# Patient Record
Sex: MALE | Race: WHITE | Employment: OTHER | URBAN - METROPOLITAN AREA
[De-identification: names, ages, dates, MRNs, and addresses within clinical notes are randomized per-mention and may not be internally consistent; named-entity substitution may affect disease eponyms.]

---

## 2017-03-14 ENCOUNTER — OFFICE VISIT (OUTPATIENT)
Dept: FAMILY MEDICINE CLINIC | Age: 42
End: 2017-03-14

## 2017-03-14 ENCOUNTER — DOCUMENTATION ONLY (OUTPATIENT)
Dept: FAMILY MEDICINE CLINIC | Age: 42
End: 2017-03-14

## 2017-03-14 VITALS
RESPIRATION RATE: 16 BRPM | WEIGHT: 183.2 LBS | OXYGEN SATURATION: 96 % | SYSTOLIC BLOOD PRESSURE: 122 MMHG | TEMPERATURE: 98.1 F | BODY MASS INDEX: 24.28 KG/M2 | HEART RATE: 98 BPM | DIASTOLIC BLOOD PRESSURE: 84 MMHG | HEIGHT: 73 IN

## 2017-03-14 DIAGNOSIS — M25.562 CHRONIC PAIN OF LEFT KNEE: ICD-10-CM

## 2017-03-14 DIAGNOSIS — S06.9X5S: ICD-10-CM

## 2017-03-14 DIAGNOSIS — Z00.00 ROUTINE GENERAL MEDICAL EXAMINATION AT A HEALTH CARE FACILITY: ICD-10-CM

## 2017-03-14 DIAGNOSIS — M79.2 NEUROPATHIC PAIN: Primary | ICD-10-CM

## 2017-03-14 DIAGNOSIS — G89.29 CHRONIC PAIN OF LEFT KNEE: ICD-10-CM

## 2017-03-14 PROBLEM — S06.9X9A TRAUMATIC BRAIN INJURY WITH LOSS OF CONSCIOUSNESS (HCC): Status: ACTIVE | Noted: 2017-03-14

## 2017-03-14 RX ORDER — TRIHEXYPHENIDYL HYDROCHLORIDE 2 MG/1
TABLET ORAL 2 TIMES DAILY
COMMUNITY

## 2017-03-14 RX ORDER — BACLOFEN 10 MG/1
10 TABLET ORAL 2 TIMES DAILY
COMMUNITY

## 2017-03-14 RX ORDER — TOPIRAMATE 25 MG/1
12.5 TABLET ORAL ONCE
COMMUNITY
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
## 2017-03-14 NOTE — PROGRESS NOTES
Pt scheduled for nerve conduction study 3/16/17, [address] [facility] [address] Patient. Left msg on pt's VM with details.

## 2017-03-14 NOTE — MR AVS SNAPSHOT
Visit Information Date & Time Provider Department Dept. Phone Encounter #  
 3/14/2017  1:00 PM Randa Chandler, [facility] [phone] [id number] Allergies as of 3/14/2017  Review Complete On: 3/14/2017 By: Randa Chandler MD  
 No Known Allergies Current Immunizations  Never Reviewed No immunizations on file. Not reviewed this visit You Were Diagnosed With   
  
 Codes Comments Neuropathic pain    -  Primary ICD-10-CM: M79.2 ICD-9-CM: 729.2 Traumatic brain injury with loss of consciousness, with loss of consciousness greater than 24 hours with return to pre-existing conscious level, sequela ([facility] [address]     ICD-10-CM: R88.4R6U 
ICD-9-CM: 979. 0 Chronic pain of left knee     ICD-10-CM: M25.562, G89.29 ICD-9-CM: 719.46, 338.29 Routine general medical examination at a health care facility     ICD-10-CM: Z00.00 ICD-9-CM: V70.0 Vitals BP Pulse Temp Resp Height(growth percentile) Weight(growth percentile) 122/84 98 98.1 °F (36.7 °C) 16 6' 0.5\" (1.842 m) 183 lb 3.2 oz (83.1 kg) SpO2 BMI Smoking Status 96% 24.5 kg/m2 Never Smoker Vitals History BMI and BSA Data Body Mass Index Body Surface Area 24.5 kg/m 2 2.06 m 2 Your Updated Medication List  
  
   
This list is accurate as of: 3/14/17  1:55 PM.  Always use your most recent med list.  
  
  
  
  
 baclofen 10 mg tablet Commonly known as:  LIORESAL Take  by mouth four (4) times daily. INNOPRAN  mg Cp24 Generic drug:  propranolol Take  by mouth. TOPAMAX 25 mg tablet Generic drug:  topiramate Take 12.5 mg by mouth once. trihexyphenidyl 2 mg tablet Commonly known as:  ARTANE Take  by mouth two (2) times a day. To-Do List   
 03/14/2017 Lab:  CBC W/O DIFF   
  
 03/14/2017 Lab:  LIPID PANEL   
  
 03/14/2017 Lab:  METABOLIC PANEL, COMPREHENSIVE Patient Instructions Electromyogram (EMG) and Nerve Conduction Studies: About These Tests What are they? An electromyogram (EMG) measures the electrical activity of your muscles when you are not using them (at rest) and when you tighten them (muscle contraction). Nerve conduction studies (NCS) measure how well and how fast the nerves can send electrical signals. EMG and nerve conduction studies are often done together. If they are done together, the nerve conduction studies are done before the EMG. Why are they done? You may need an EMG to find diseases that damage your muscles or nerves or to find why you cannot move your muscles (paralysis), why they feel weak, or why they twitch. You may need nerve conduction studies to find damage to the nerves that lead from the brain and spinal cord to the rest of the body (peripheral nervous system). Nerve conduction studies are often used to help find nerve disorders, such as carpal tunnel syndrome. How can you prepare for these tests? · Tell your doctors ALL the medicines, vitamins, supplements, and herbal remedies you take. Some medicines can affect the test results. You may need to stop taking some medicines before you have this test. 
· If you take aspirin or some other blood thinner, be sure to talk to your doctor. He or she will tell you if you should stop taking it before your test. Make sure that you understand exactly what your doctor wants you to do. · Wear loose-fitting clothing. You may be given a hospital gown to wear. · The electrodes for the test are attached to your skin. Your skin needs to be clean and free of sprays, oils, creams, and lotions. What happens during the tests? You lie on a table or bed or sit in a reclining chair so your muscles are relaxed. For an EMG: 
· Your doctor will insert a needle electrode into a muscle.  This will record the electrical activity while the muscle is at rest. You may feel a quick, sharp pain when the needle electrode is put into a muscle. · Your doctor will ask you to tighten the same muscle slowly and steadily while the electrical activity is recorded. · Your doctor may move the electrode to a different area of the muscle or a different muscle. For nerve conduction studies: 
· Your doctor will attach two types of electrodes to your skin. ¨ One type of electrode is placed over a nerve and will give the nerve an electrical pulse. ¨ The other type of electrode is placed over the muscle that the nerve controls. It will record how long it takes the muscle to react to the electrical pulse. · You will be able to feel the electrical pulses. They are small shocks and are safe. What else should you know about these tests? · After an EMG, you may be sore and have a tingling feeling in your muscles for up to 2 days. You may have small bruises or swelling at the needle site. · For an EMG, you may be asked to sign a consent form. Talk to your doctor about any concerns you have about the need for the test, its risks, how it will be done, or what the results will mean. How long do they take? · An EMG may take 30 to 60 minutes. · Nerve conduction tests may take from 15 minutes to 1 hour or more. It depends on how many nerves and muscles your doctor tests. What happens after these tests? · If any of the test areas are sore: ¨ Put ice or a cold pack on the area for 10 to 20 minutes at a time. Put a thin cloth between the ice and your skin. ¨ Take an over-the-counter pain medicine, such as acetaminophen (Tylenol), ibuprofen (Advil, Motrin), or naproxen (Aleve). Be safe with medicines. Read and follow all instructions on the label. · You will probably be able to go home right away. · You can go back to your usual activities right away. When should you call for help? Watch closely for changes in your health, and be sure to contact your doctor if: · Muscle pain from an EMG test gets worse or you have swelling, tenderness, or pus at any of the needle sites. · You have any problems that you think may be from the test. 
· You have any questions about the test or have not received your results. Follow-up care is a key part of your treatment and safety. Be sure to make and go to all appointments, and call your doctor if you are having problems. It's also a good idea to keep a list of the medicines you take. Ask your doctor when you can expect to have your test results. Where can you learn more? Go to http://joe-kenzie.info/. Enter S440 in the search box to learn more about \"Electromyogram (EMG) and Nerve Conduction Studies: About These Tests. \" Current as of: June 1, 2016 Content Version: 11.1 © 7100-2618 [employer]. Care instructions adapted under license by bright box (which disclaims liability or warranty for this information). If you have questions about a medical condition or this instruction, always ask your healthcare professional. [place] [address] any warranty or liability for your use of this information. Introducing [facility]! [facility] introduces SimpleOrder patient portal. Now you can access parts of your medical record, email your doctor's office, and request medication refills online. 1. In your internet browser, go to [email or web address] for iPhone and [email or web address] 2. Click on the First Time User? Click Here link in the Sign In box. You will see the New Member Sign Up page. 3. Enter your SimpleOrder Access Code exactly as it appears below. You will not need to use this code after youve completed the sign-up process. If you do not sign up before the expiration date, you must request a new code. · SimpleOrder Access Code: [email or web address] Expires: 6/12/2017  1:55 PM 
 
4.  Enter the last four digits of your Social Security Number (xxxx) and Date of Birth (mm/dd/yyyy) as indicated and click Submit. You will be taken to the next sign-up page. 5. Create a zlien ID. This will be your zlien login ID and cannot be changed, so think of one that is secure and easy to remember. 6. Create a zlien password. You can change your password at any time. 7. Enter your Password Reset Question and Answer. This can be used at a later time if you forget your password. 8. Enter your e-mail address. You will receive e-mail notification when new information is available in [address] 9. Click Sign Up. You can now view and download portions of your medical record. 10. Click the Download Summary menu link to download a portable copy of your medical information. If you have questions, please visit the Frequently Asked Questions section of the zlien website. Remember, zlien is NOT to be used for urgent needs. For medical emergencies, dial 911. Now available from your iPhone and Android! Please provide this summary of care documentation to your next provider. Your primary care clinician is listed as [address]. If you have any questions after today's visit, please call [phone].

## 2017-03-14 NOTE — PATIENT INSTRUCTIONS
Electromyogram (EMG) and Nerve Conduction Studies: About These Tests  What are they? An electromyogram (EMG) measures the electrical activity of your muscles when you are not using them (at rest) and when you tighten them (muscle contraction). Nerve conduction studies (NCS) measure how well and how fast the nerves can send electrical signals. EMG and nerve conduction studies are often done together. If they are done together, the nerve conduction studies are done before the EMG. Why are they done? You may need an EMG to find diseases that damage your muscles or nerves or to find why you cannot move your muscles (paralysis), why they feel weak, or why they twitch. You may need nerve conduction studies to find damage to the nerves that lead from the brain and spinal cord to the rest of the body (peripheral nervous system). Nerve conduction studies are often used to help find nerve disorders, such as carpal tunnel syndrome. How can you prepare for these tests? · Tell your doctors ALL the medicines, vitamins, supplements, and herbal remedies you take. Some medicines can affect the test results. You may need to stop taking some medicines before you have this test.  · If you take aspirin or some other blood thinner, be sure to talk to your doctor. He or she will tell you if you should stop taking it before your test. Make sure that you understand exactly what your doctor wants you to do. · Wear loose-fitting clothing. You may be given a hospital gown to wear. · The electrodes for the test are attached to your skin. Your skin needs to be clean and free of sprays, oils, creams, and lotions. What happens during the tests? You lie on a table or bed or sit in a reclining chair so your muscles are relaxed. For an EMG:  · Your doctor will insert a needle electrode into a muscle.  This will record the electrical activity while the muscle is at rest. You may feel a quick, sharp pain when the needle electrode is put into a muscle. · Your doctor will ask you to tighten the same muscle slowly and steadily while the electrical activity is recorded. · Your doctor may move the electrode to a different area of the muscle or a different muscle. For nerve conduction studies:  · Your doctor will attach two types of electrodes to your skin. ¨ One type of electrode is placed over a nerve and will give the nerve an electrical pulse. ¨ The other type of electrode is placed over the muscle that the nerve controls. It will record how long it takes the muscle to react to the electrical pulse. · You will be able to feel the electrical pulses. They are small shocks and are safe. What else should you know about these tests? · After an EMG, you may be sore and have a tingling feeling in your muscles for up to 2 days. You may have small bruises or swelling at the needle site. · For an EMG, you may be asked to sign a consent form. Talk to your doctor about any concerns you have about the need for the test, its risks, how it will be done, or what the results will mean. How long do they take? · An EMG may take 30 to 60 minutes. · Nerve conduction tests may take from 15 minutes to 1 hour or more. It depends on how many nerves and muscles your doctor tests. What happens after these tests? · If any of the test areas are sore:  ¨ Put ice or a cold pack on the area for 10 to 20 minutes at a time. Put a thin cloth between the ice and your skin. ¨ Take an over-the-counter pain medicine, such as acetaminophen (Tylenol), ibuprofen (Advil, Motrin), or naproxen (Aleve). Be safe with medicines. Read and follow all instructions on the label. · You will probably be able to go home right away. · You can go back to your usual activities right away. When should you call for help?   Watch closely for changes in your health, and be sure to contact your doctor if:  · Muscle pain from an EMG test gets worse or you have swelling, tenderness, or pus at any of the needle sites. · You have any problems that you think may be from the test.  · You have any questions about the test or have not received your results. Follow-up care is a key part of your treatment and safety. Be sure to make and go to all appointments, and call your doctor if you are having problems. It's also a good idea to keep a list of the medicines you take. Ask your doctor when you can expect to have your test results. Where can you learn more? Go to http://joe-kenzie.info/. Enter P377 in the search box to learn more about \"Electromyogram (EMG) and Nerve Conduction Studies: About These Tests. \"  Current as of: June 1, 2016  Content Version: 11.1  © 4707-9279 [employer]. Care instructions adapted under license by [employer] (which disclaims liability or warranty for this information). If you have questions about a medical condition or this instruction, always ask your healthcare professional. [address] any warranty or liability for your use of this information.

## 2017-03-15 ENCOUNTER — TELEPHONE (OUTPATIENT)
Dept: FAMILY MEDICINE CLINIC | Age: 42
End: 2017-03-15

## 2017-03-15 NOTE — TELEPHONE ENCOUNTER
Pt called to inform that he has an appt with the [facility] surgeon at 8:30am tomorrow at the [facility] location

## 2017-04-03 ENCOUNTER — HOSPITAL ENCOUNTER (OUTPATIENT)
Dept: LAB | Age: 42
Discharge: HOME | End: 2017-04-03
Payer: MEDICARE

## 2017-04-03 ENCOUNTER — TELEPHONE (OUTPATIENT)
Dept: FAMILY MEDICINE CLINIC | Age: 42
End: 2017-04-03

## 2017-04-03 DIAGNOSIS — Z00.00 ROUTINE GENERAL MEDICAL EXAMINATION AT A HEALTH CARE FACILITY: ICD-10-CM

## 2017-04-03 DIAGNOSIS — M79.2 NEUROPATHIC PAIN: ICD-10-CM

## 2017-04-03 LAB
ALBUMIN SERPL BCP-MCNC: 3.9 G/DL (ref 3.4–5)
ALBUMIN/GLOB SERPL: 1.4 {RATIO} (ref 0.8–1.7)
ALP SERPL-CCNC: 65 U/L (ref 45–117)
ALT SERPL-CCNC: 26 U/L (ref 16–61)
ANION GAP BLD CALC-SCNC: 10 MMOL/L (ref 3–18)
AST SERPL W P-5'-P-CCNC: 16 U/L (ref 15–37)
BILIRUB SERPL-MCNC: 0.5 MG/DL (ref 0.2–1)
BUN SERPL-MCNC: 19 MG/DL (ref 7–18)
BUN/CREAT SERPL: 23 (ref 12–20)
CALCIUM SERPL-MCNC: 8.3 MG/DL (ref 8.5–10.1)
CHLORIDE SERPL-SCNC: 107 MMOL/L (ref 100–108)
CHOLEST SERPL-MCNC: 179 MG/DL
CO2 SERPL-SCNC: 25 MMOL/L (ref 21–32)
CREAT SERPL-MCNC: 0.84 MG/DL (ref 0.6–1.3)
ERYTHROCYTE [DISTWIDTH] IN BLOOD BY AUTOMATED COUNT: 13.3 % (ref 11.6–14.5)
GLOBULIN SER CALC-MCNC: 2.8 G/DL (ref 2–4)
GLUCOSE SERPL-MCNC: 92 MG/DL (ref 74–99)
HCT VFR BLD AUTO: 48.6 % (ref 36–48)
HDLC SERPL-MCNC: 49 MG/DL (ref 40–60)
HDLC SERPL: 3.7 {RATIO} (ref 0–5)
HGB BLD-MCNC: 15.5 G/DL (ref 13–16)
LDLC SERPL CALC-MCNC: 113 MG/DL (ref 0–100)
LIPID PROFILE,FLP: ABNORMAL
MCH RBC QN AUTO: 29.3 PG (ref 24–34)
MCHC RBC AUTO-ENTMCNC: 31.9 G/DL (ref 31–37)
MCV RBC AUTO: 91.9 FL (ref 74–97)
PLATELET # BLD AUTO: 184 K/UL (ref 135–420)
PMV BLD AUTO: 12.4 FL (ref 9.2–11.8)
POTASSIUM SERPL-SCNC: 4.3 MMOL/L (ref 3.5–5.5)
PROT SERPL-MCNC: 6.7 G/DL (ref 6.4–8.2)
RBC # BLD AUTO: 5.29 M/UL (ref 4.7–5.5)
SODIUM SERPL-SCNC: 142 MMOL/L (ref 136–145)
TRIGL SERPL-MCNC: 85 MG/DL (ref ?–150)
TSH SERPL DL<=0.05 MIU/L-ACNC: 1.79 UIU/ML (ref 0.36–3.74)
VIT B12 SERPL-MCNC: 600 PG/ML (ref 211–911)
VLDLC SERPL CALC-MCNC: 17 MG/DL
WBC # BLD AUTO: 6.1 K/UL (ref 4.6–13.2)

## 2017-04-03 PROCEDURE — 84443 ASSAY THYROID STIM HORMONE: CPT | Performed by: INTERNAL MEDICINE

## 2017-04-03 PROCEDURE — 36415 COLL VENOUS BLD VENIPUNCTURE: CPT | Performed by: INTERNAL MEDICINE

## 2017-04-03 PROCEDURE — 80061 LIPID PANEL: CPT | Performed by: INTERNAL MEDICINE

## 2017-04-03 PROCEDURE — 85027 COMPLETE CBC AUTOMATED: CPT | Performed by: INTERNAL MEDICINE

## 2017-04-03 PROCEDURE — 82607 VITAMIN B-12: CPT | Performed by: INTERNAL MEDICINE

## 2017-04-03 PROCEDURE — 80053 COMPREHEN METABOLIC PANEL: CPT | Performed by: INTERNAL MEDICINE

## 2017-04-03 NOTE — TELEPHONE ENCOUNTER
Pt callled and states that his Doctor up [facility] wants the pts to get an mri done. Pt states that he gave us a copy of his mri order and that we would be faxing over the order to [facility] for him. He has yet to hear from [facility] I offered to give him the number for [facility] to call them to [facility].  PLease advisde

## 2017-04-04 NOTE — TELEPHONE ENCOUNTER
Call placed to patient, notified that order for MRI from previous MD had been sent over to [facility]. Number for scheduling given to patient. Advised to return call with any questions.

## 2017-04-18 ENCOUNTER — OFFICE VISIT (OUTPATIENT)
Dept: FAMILY MEDICINE CLINIC | Age: 42
End: 2017-04-18

## 2017-04-18 VITALS
OXYGEN SATURATION: 98 % | WEIGHT: 183 LBS | DIASTOLIC BLOOD PRESSURE: 82 MMHG | TEMPERATURE: 98 F | HEIGHT: 72 IN | RESPIRATION RATE: 18 BRPM | SYSTOLIC BLOOD PRESSURE: 128 MMHG | BODY MASS INDEX: 24.79 KG/M2 | HEART RATE: 64 BPM

## 2017-04-18 DIAGNOSIS — M51.24 THORACIC DISC HERNIATION: ICD-10-CM

## 2017-04-18 DIAGNOSIS — M48.00 CENTRAL STENOSIS OF SPINAL CANAL: ICD-10-CM

## 2017-04-18 DIAGNOSIS — M48.061 FORAMINAL STENOSIS OF LUMBAR REGION: Primary | ICD-10-CM

## 2017-04-18 PROBLEM — S46.212A RUPTURE OF LEFT BICEPS TENDON: Status: ACTIVE | Noted: 2017-04-18

## 2017-04-18 NOTE — PROGRESS NOTES
Assessment/Plan:    1. Foraminal stenosis of lumbar region  -pt declines intervention at this time. Declines neurosurgery referral and prescription meds. 2. Central stenosis of spinal canal    3. Thoracic disc herniation    The plan was discussed with the patient. The patient verbalized understanding and is in agreement with the plan. All medication potential side effects were discussed with the patient. Health Maintenance:   Health Maintenance   Topic Date Due    DTaP/Tdap/Td series (1 - Tdap) 06/14/1996    INFLUENZA AGE 9 TO ADULT  Addressed     Negar Clemons is a 39 y.o. male and presents with Follow-up     Subjective:  Pt had MRI done, ordered by physician in [place] (done here). It's not entirely clear why the physician ordered a thoracic spine MRI, because we hadn't really discussed any symptoms. Upon further questioning, he states he intermittently has some numbness in the R hand only. No proximal arm sx. We had discussed his anterior thigh paresthesias, and the MRI findings may or may not correlate to his sx. I had ordered a NCS prior to the MRI getting done, which was never performed. He doesn't wish to pursue any intervention with a neurosurgeon or any prescription meds. He states OTC advil is sufficient to manage his sx. ROS:  Constitutional: No recent weight change. No weakness/fatigue. No f/c. Cardiovascular: No CP/palpitations. No JOHNSON/orthopnea/PND. Respiratory: No cough/sputum, dyspnea, wheezing. Gastointestinal: No dysphagia, reflux. No n/v. No constipation/diarrhea. No melena/rectal bleeding. Neurological: No seizures/numbness/weakness. + paresthesias. The problem list was updated as a part of today's visit. Patient Active Problem List   Diagnosis Code    Traumatic brain injury with loss of consciousness ([facility] [address] S06. 9X9A    Foraminal stenosis of lumbar region M99.83    Central stenosis of spinal canal M48.00       The PSH, FH were reviewed.     SH:  Social History   Substance Use Topics    Smoking status: Never Smoker    Smokeless tobacco: Never Used    Alcohol use Yes      Comment: socially       Medications/Allergies:  Current Outpatient Prescriptions on File Prior to Visit   Medication Sig Dispense Refill    trihexyphenidyl (ARTANE) 2 mg tablet Take  by mouth two (2) times a day.  propranolol (INNOPRAN XL) 120 mg cp24 Take  by mouth.  baclofen (LIORESAL) 10 mg tablet Take  by mouth four (4) times daily.  topiramate (TOPAMAX) 25 mg tablet Take 12.5 mg by mouth once. No current facility-administered medications on file prior to visit. No Known Allergies    Objective:  Visit Vitals    /82    Pulse 64    Temp 98 °F (36.7 °C) (Oral)    Resp 18    Ht 6' (1.829 m)    Wt 183 lb (83 kg)    SpO2 98%    BMI 24.82 kg/m2      Constitutional: Well developed, nourished, no distress, alert   CV: S1, S2.  RRR. No murmurs/rubs. No thrills palpated. No carotid bruits. Intact distal pulses. No edema. Pulm: No abnormalities on inspection. Clear to auscultation bilaterally. No wheezing/rhonchi. Normal effort. Neuro: A/O x 3.  Speech dysarthric   MS: scott defomity of L biceps head  Labwork and Ancillary Studies:    CBC w/Diff  Lab Results   Component Value Date/Time    WBC 6.1 04/03/2017 07:24 AM    HGB 15.5 04/03/2017 07:24 AM    PLATELET 065 38/29/8715 07:24 AM         Basic Metabolic Profile/LFTs  Lab Results   Component Value Date/Time    Sodium 142 04/03/2017 07:24 AM    Potassium 4.3 04/03/2017 07:24 AM    Chloride 107 04/03/2017 07:24 AM    CO2 25 04/03/2017 07:24 AM    Anion gap 10 04/03/2017 07:24 AM    Glucose 92 04/03/2017 07:24 AM    BUN 19 04/03/2017 07:24 AM    Creatinine 0.84 04/03/2017 07:24 AM    BUN/Creatinine ratio 23 04/03/2017 07:24 AM    GFR est AA >60 04/03/2017 07:24 AM    GFR est non-AA >60 04/03/2017 07:24 AM    Calcium 8.3 04/03/2017 07:24 AM      Lab Results   Component Value Date/Time    ALT (SGPT) 26 04/03/2017 07:24 AM    AST (SGOT) 16 04/03/2017 07:24 AM    Alk. phosphatase 65 04/03/2017 07:24 AM    Bilirubin, total 0.5 04/03/2017 07:24 AM       Cholesterol  Lab Results   Component Value Date/Time    Cholesterol, total 179 04/03/2017 07:24 AM    HDL Cholesterol 49 04/03/2017 07:24 AM    LDL, calculated 113 04/03/2017 07:24 AM    Triglyceride 85 04/03/2017 07:24 AM    CHOL/HDL Ratio 3.7 04/03/2017 07:24 AM     MRI lumbar spine: Mild to moderate degenerative levoconvex lumbar scoliosis with panlumbar and asymmetric degenerative disc disease greatest at moderate at L1-2 L2-3 and L3-4  with associated intervertebral osteochondrosis including edema-like reaction and chronic right lateral vertebral body wedging. 2.  Mild-to-moderate L2-3 and L3-4, mild L1-2 central stenosis from mild disc bulging and spondylosis with broad outer annular fissures and lesser contribution from facet joint osteoarthritis/ligamentum flavum thickening as above  3.  Multilevel and bilateral foraminal stenosis, also detailed above, greatest and moderate on the left at L5-S1 from eccentric spondylosis and facet joint osteoarthritis with left L5 exiting root impingement.  Multilevel and bilateral mostly mild facet joint osteoarthritis, for the most part asymmetric in association with scoliosis. MRI thoracic spine:  Moderate sized extruded left paracentral T7-8 and T8-9 disc herniations, with mild to moderate flattening of the left ventral cord without abnormal cord signal.

## 2017-04-18 NOTE — PATIENT INSTRUCTIONS
Herniated Disc: Exercises  Your Care Instructions  Here are some examples of typical rehabilitation exercises for your condition. Start each exercise slowly. Ease off the exercise if you start to have pain. Your doctor or physical therapist will tell you when you can start these exercises and which ones will work best for you. How to do the exercises  Note: These exercises can help you move easier and feel better. But when you first start doing them, you may have more pain in your back. This is normal. But it is important to pay close attention to your pain during and after each exercise. · Keep doing these exercises if your pain stays the same or moves from your leg and buttock more toward the middle of your spine. Pain moving out of your leg and buttock is a good sign. · Stop doing these exercises if your pain gets worse in your leg and buttock. Stop if you start to have pain in your leg and buttock that you didn't have before. Be sure to do these exercises in the order they appear. Note how your pain changes before you move to the next one. If your pain is much worse right after exercise and stays worse the next day, do not do any of these exercises. 1. Rest on belly    1. Lie on your stomach, face down, with your head turned to the side. Place your arms beside your body. If this bothers your neck, place your hands, one on top of the other, underneath your forehead. This will help support your head and neck. 2. Try to relax your lower back muscles as much as you can. 3. Continue to lie on your stomach for 2 minutes. 4. If your pain spreads down your leg or increases down your leg, stop this exercise and do not do the next exercises. 2. Press-up    1. Lie on your stomach, face down. Keep your elbows tucked into your sides and under your shoulders. 2. Press your elbows down into the floor to raise your upper back. As you do this, relax your stomach muscles.  Allow your back to arch without using your back muscles. Let your low back relax completely as you arch up. 3. Hold this position for 2 minutes. 4. Repeat 2 to 4 times. 5. If your pain spreads down your leg or increases down your leg, stop this exercise and do not do the next exercises. 3. Full press-up    1. Lie on your stomach, face down. Keep your elbows tucked into your sides and under your shoulders. 2. Straighten your elbows, and push your upper body up as far as you can. Allow your lower back to sag. Keep your hips, pelvis, and legs relaxed. 3. Hold this position for 5 seconds, and then relax. 4. Repeat 10 times. Each time, try to raise your upper body a little higher and hold your arms a bit straighter. 5. If your pain spreads down your leg or gets worse down your leg, stop this exercise and do not move to the next exercise. 6. If you can't do this exercise, you may instead try the backward bend exercise that follows. 4. Backward bend    · Stand with your feet hip-width apart. Your toes should point forward. Do not lock your knees. · Place your hands in the small of your back. · Bend backward as far as you can, keeping your knees straight. Hold this position for 2 to 3 seconds. Then return to your starting position. · Repeat 2 to 4 times. Each time, try to bend backward a little farther, until you bend backward as far as you can. · If your pain spreads down your leg or increases down your leg, stop this exercise. Follow-up care is a key part of your treatment and safety. Be sure to make and go to all appointments, and call your doctor if you are having problems. It's also a good idea to know your test results and keep a list of the medicines you take. Where can you learn more? Go to http://joe-kenzie.info/. Enter [phone] [id number] in the search box to learn more about \"Herniated Disc: Exercises. \"  Current as of: May 23, 2016  Content Version: 11.2  © 0823-6204 [employer].  Care instructions adapted under license by [address] (which disclaims liability or warranty for this information). If you have questions about a medical condition or this instruction, always ask your healthcare professional. [address] any warranty or liability for your use of this information.

## 2017-04-18 NOTE — MR AVS SNAPSHOT
Visit Information Date & Time Provider Department Dept. Phone Encounter #  
 4/18/2017  7:30 AM Poly Britton, [facility] [phone] [id number] Upcoming Health Maintenance Date Due DTaP/Tdap/Td series (1 - Tdap) 6/14/1996 INFLUENZA AGE 9 TO ADULT 8/1/2016 Allergies as of 4/18/2017  Review Complete On: 4/18/2017 By: Yazan Perez LPN No Known Allergies Current Immunizations  Never Reviewed No immunizations on file. Not reviewed this visit You Were Diagnosed With   
  
 Codes Comments Foraminal stenosis of lumbar region    -  Primary ICD-10-CM: M99.83 ICD-9-CM: 724.02 Central stenosis of spinal canal     ICD-10-CM: M48.00 ICD-9-CM: 724.00 Thoracic disc herniation     ICD-10-CM: M51.24 
ICD-9-CM: 722.11 Vitals BP Pulse Temp Resp Height(growth percentile) Weight(growth percentile) 128/82 64 98 °F (36.7 °C) (Oral) 18 6' (1.829 m) 183 lb (83 kg) SpO2 BMI Smoking Status 98% 24.82 kg/m2 Never Smoker Vitals History BMI and BSA Data Body Mass Index Body Surface Area  
 24.82 kg/m 2 2.05 m 2 Your Updated Medication List  
  
   
This list is accurate as of: 4/18/17  7:45 AM.  Always use your most recent med list.  
  
  
  
  
 baclofen 10 mg tablet Commonly known as:  LIORESAL Take  by mouth four (4) times daily. INNOPRAN  mg Cp24 Generic drug:  propranolol Take  by mouth. TOPAMAX 25 mg tablet Generic drug:  topiramate Take 12.5 mg by mouth once. trihexyphenidyl 2 mg tablet Commonly known as:  ARTANE Take  by mouth two (2) times a day. Patient Instructions Herniated Disc: Exercises Your Care Instructions Here are some examples of typical rehabilitation exercises for your condition. Start each exercise slowly. Ease off the exercise if you start to have pain. Your doctor or physical therapist will tell you when you can start these exercises and which ones will work best for you. How to do the exercises Note: These exercises can help you move easier and feel better. But when you first start doing them, you may have more pain in your back. This is normal. But it is important to pay close attention to your pain during and after each exercise. · Keep doing these exercises if your pain stays the same or moves from your leg and buttock more toward the middle of your spine. Pain moving out of your leg and buttock is a good sign. · Stop doing these exercises if your pain gets worse in your leg and buttock. Stop if you start to have pain in your leg and buttock that you didn't have before. Be sure to do these exercises in the order they appear. Note how your pain changes before you move to the next one. If your pain is much worse right after exercise and stays worse the next day, do not do any of these exercises. 1. Rest on belly 1. Lie on your stomach, face down, with your head turned to the side. Place your arms beside your body. If this bothers your neck, place your hands, one on top of the other, underneath your forehead. This will help support your head and neck. 2. Try to relax your lower back muscles as much as you can. 3. Continue to lie on your stomach for 2 minutes. 4. If your pain spreads down your leg or increases down your leg, stop this exercise and do not do the next exercises. 2. Press-up 1. Lie on your stomach, face down. Keep your elbows tucked into your sides and under your shoulders. 2. Press your elbows down into the floor to raise your upper back. As you do this, relax your stomach muscles. Allow your back to arch without using your back muscles. Let your low back relax completely as you arch up. 3. Hold this position for 2 minutes. 4. Repeat 2 to 4 times.  
5. If your pain spreads down your leg or increases down your leg, stop this exercise and do not do the next exercises. 3. Full press-up 1. Lie on your stomach, face down. Keep your elbows tucked into your sides and under your shoulders. 2. Straighten your elbows, and push your upper body up as far as you can. Allow your lower back to sag. Keep your hips, pelvis, and legs relaxed. 3. Hold this position for 5 seconds, and then relax. 4. Repeat 10 times. Each time, try to raise your upper body a little higher and hold your arms a bit straighter. 5. If your pain spreads down your leg or gets worse down your leg, stop this exercise and do not move to the next exercise. 6. If you can't do this exercise, you may instead try the backward bend exercise that follows. 4. Backward bend · Stand with your feet hip-width apart. Your toes should point forward. Do not lock your knees. · Place your hands in the small of your back. · Bend backward as far as you can, keeping your knees straight. Hold this position for 2 to 3 seconds. Then return to your starting position. · Repeat 2 to 4 times. Each time, try to bend backward a little farther, until you bend backward as far as you can. · If your pain spreads down your leg or increases down your leg, stop this exercise. Follow-up care is a key part of your treatment and safety. Be sure to make and go to all appointments, and call your doctor if you are having problems. It's also a good idea to know your test results and keep a list of the medicines you take. Where can you learn more? Go to http://joe-kenzie.info/. Enter [id number] [phone] [id number] in the search box to learn more about \"Herniated Disc: Exercises. \" Current as of: May 23, 2016 Content Version: 11.2 © 6758-1887 [employer]. Care instructions adapted under license by [employer] (which disclaims liability or warranty for this information).  If you have questions about a medical condition or this instruction, always ask your healthcare professional. [facility] any warranty or liability for your use of this information. Introducing [facility]! [facility] introduces Azuki ([employer]/Geng[employer]) patient portal. Now you can access parts of your medical record, email your doctor's office, and request medication refills online. 1. In your internet browser, go to [email or web address] 2. Click on the First Time User? Click Here link in the Sign In box. You will see the New Member Sign Up page. 3. Enter your Azuki ([employer]Geng[employer]) Access Code exactly as it appears below. You will not need to use this code after youve completed the sign-up process. If you do not sign up before the expiration date, you must request a new code. · Azuki ([employer]Geng[employer]) Access Code: [email or web address] Expires: 6/12/2017  1:55 PM 
 
4. Enter the last four digits of your Social Security Number (xxxx) and Date of Birth (mm/dd/yyyy) as indicated and click Submit. You will be taken to the next sign-up page. 5. Create a Azuki ([employer]/Gengib[employer]) ID. This will be your Azuki ([employer]) login ID and cannot be changed, so think of one that is secure and easy to remember. 6. Create a Azuki ([employer]) password. You can change your password at any time. 7. Enter your Password Reset Question and Answer. This can be used at a later time if you forget your password. 8. Enter your e-mail address. You will receive e-mail notification when new information is available in [address] 9. Click Sign Up. You can now view and download portions of your medical record. 10. Click the Download Summary menu link to download a portable copy of your medical information. If you have questions, please visit the Frequently Asked Questions section of the Azuki ([employer]) website. Remember, Azuki ([employer]Gengibre) is NOT to be used for urgent needs. For medical emergencies, dial 911. Now available from your iPhone and Android! Please provide this summary of care documentation to your next provider. Your primary care clinician is listed as Kade Le. If you have any questions after today's visit, please call [phone].

## 2017-04-18 NOTE — PROGRESS NOTES
Ruth Martinez is a 39 y.o. male  Patient here for follow up for MRI. Patient states he has had a recent fall with no injuries. 1. Have you been to the ER, urgent care clinic since your last visit? Hospitalized since your last visit? No    2. Have you seen or consulted any other health care providers outside of the [address] [place] [address] since your last visit? Include any pap smears or colon screening.  Neuro

## 2017-05-09 ENCOUNTER — OFFICE VISIT (OUTPATIENT)
Dept: FAMILY MEDICINE CLINIC | Age: 42
End: 2017-05-09

## 2017-05-09 VITALS
HEART RATE: 54 BPM | TEMPERATURE: 98.2 F | WEIGHT: 187 LBS | OXYGEN SATURATION: 98 % | RESPIRATION RATE: 22 BRPM | SYSTOLIC BLOOD PRESSURE: 122 MMHG | HEIGHT: 72 IN | DIASTOLIC BLOOD PRESSURE: 70 MMHG | BODY MASS INDEX: 25.33 KG/M2

## 2017-05-09 DIAGNOSIS — M25.512 ACUTE PAIN OF LEFT SHOULDER: ICD-10-CM
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
## 2017-05-09 RX ORDER — IBUPROFEN 800 MG/1
800 TABLET ORAL
Qty: 60 TAB | Refills: 0 | Status: SHIPPED | OUTPATIENT
Start: 2017-05-09 | End: 2020-10-27

## 2017-05-09 NOTE — PATIENT INSTRUCTIONS
Rotator Cuff: Exercises  Your Care Instructions  Here are some examples of typical rehabilitation exercises for your condition. Start each exercise slowly. Ease off the exercise if you start to have pain. Your doctor or physical therapist will tell you when you can start these exercises and which ones will work best for you. How to do the exercises  Pendulum swing    Note: If you have pain in your back, do not do this exercise. 1. Hold on to a table or the back of a chair with your good arm. Then bend forward a little and let your sore arm hang straight down. This exercise does not use the arm muscles. Rather, use your legs and your hips to create movement that makes your arm swing freely. 2. Use the movement from your hips and legs to guide the slightly swinging arm back and forth like a pendulum (or elephant trunk). Then guide it in circles that start small (about the size of a dinner plate). Make the circles a bit larger each day, as your pain allows. 3. Do this exercise for 5 minutes, 5 to 7 times each day. 4. As you have less pain, try bending over a little farther to do this exercise. This will increase the amount of movement at your shoulder. Posterior stretching exercise    1. Hold the elbow of your injured arm with your other hand. 2. Use your hand to pull your injured arm gently up and across your body. You will feel a gentle stretch across the back of your injured shoulder. 3. Hold for at least 15 to 30 seconds. Then slowly lower your arm. 4. Repeat 2 to 4 times. Up-the-back stretch    Note: Your doctor or physical therapist may want you to wait to do this stretch until you have regained most of your range of motion and strength. You can do this stretch in different ways. Hold any of these stretches for at least 15 to 30 seconds. Repeat them 2 to 4 times. 1. Put your hand in your back pocket. Let it rest there to stretch your shoulder.   2. With your other hand, hold your injured arm (palm outward) behind your back by the wrist. Pull your arm up gently to stretch your shoulder. 3. Next, put a towel over your other shoulder. Put the hand of your injured arm behind your back. Now hold the back end of the towel. With the other hand, hold the front end of the towel in front of your body. Pull gently on the front end of the towel. This will bring your hand farther up your back to stretch your shoulder. Overhead stretch    1. Standing about an arm's length away, grasp onto a solid surface. You could use a countertop, a doorknob, or the back of a sturdy chair. 2. With your knees slightly bent, bend forward with your arms straight. Lower your upper body, and let your shoulders stretch. 3. As your shoulders are able to stretch farther, you may need to take a step or two backward. 4. Hold for at least 15 to 30 seconds. Then stand up and relax. If you had stepped back during your stretch, step forward so you can keep your hands on the solid surface. 5. Repeat 2 to 4 times. Shoulder flexion (lying down)    Note: To make a wand for this exercise, use a piece of PVC pipe or a broom handle with the broom removed. Make the wand about a foot wider than your shoulders. 1. Lie on your back, holding a wand with both hands. Your palms should face down as you hold the wand. 2. Keeping your elbows straight, slowly raise your arms over your head. Raise them until you feel a stretch in your shoulders, upper back, and chest.  3. Hold for 15 to 30 seconds. 4. Repeat 2 to 4 times. Shoulder rotation (lying down)    Note: To make a wand for this exercise, use a piece of PVC pipe or a broom handle with the broom removed. Make the wand about a foot wider than your shoulders. 1. Lie on your back. Hold a wand with both hands with your elbows bent and palms up. 2. Keep your elbows close to your body, and move the wand across your body toward the sore arm. 3. Hold for 8 to 12 seconds. 4. Repeat 2 to 4 times.   Desmond Martin climbing (to the side)    Note: Avoid any movement that is straight to your side, and be careful not to arch your back. Your arm should stay about 30 degrees to the front of your side. 1. Stand with your side to a wall so that your fingers can just touch it at an angle about 30 degrees toward the front of your body. 2. Walk the fingers of your injured arm up the wall as high as pain permits. Try not to shrug your shoulder up toward your ear as you move your arm up. 3. Hold that position for a count of at least 15 to 20.  4. Walk your fingers back down to the starting position. 5. Repeat at least 2 to 4 times. Try to reach higher each time. Wall climbing (to the front)    Note: During this stretching exercise, be careful not to arch your back. 1. Face a wall, and stand so your fingers can just touch it. 2. Keeping your shoulder down, walk the fingers of your injured arm up the wall as high as pain permits. (Don't shrug your shoulder up toward your ear.)  3. Hold your arm in that position for at least 15 to 30 seconds. 4. Slowly walk your fingers back down to where you started. 5. Repeat at least 2 to 4 times. Try to reach higher each time. Shoulder blade squeeze    1. Stand with your arms at your sides, and squeeze your shoulder blades together. Do not raise your shoulders up as you squeeze. 2. Hold 6 seconds. 3. Repeat 8 to 12 times. Scapular exercise: Arm reach    1. Lie flat on your back. This exercise is a very slight motion that starts with your arms raised (elbows straight, arms straight). 2. From this position, reach higher toward the cuco or ceiling. Keep your elbows straight. All motion should be from your shoulder blade only. 3. Relax your arms back to where you started. 4. Repeat 8 to 12 times. Arm raise to the side    Note: During this strengthening exercise, your arm should stay about 30 degrees to the front of your side.   1. Slowly raise your injured arm to the side, with your thumb facing up. Raise your arm 60 degrees at the most (shoulder level is 90 degrees). 2. Hold the position for 3 to 5 seconds. Then lower your arm back to your side. If you need to, bring your \"good\" arm across your body and place it under the elbow as you lower your injured arm. Use your good arm to keep your injured arm from dropping down too fast.  3. Repeat 8 to 12 times. 4. When you first start out, don't hold any extra weight in your hand. As you get stronger, you may use a 1-pound to 2-pound dumbbell or a small can of food. Shoulder flexor and extensor exercise    Note: These are isometric exercises. That means you contract your muscles without actually moving. · Push forward (flex): Stand facing a wall or doorjamb, about 6 inches or less back. Hold your injured arm against your body. Make a closed fist with your thumb on top. Then gently push your hand forward into the wall with about 25% to 50% of your strength. Don't let your body move backward as you push. Hold for about 6 seconds. Relax for a few seconds. Repeat 8 to 12 times. · Push backward (extend): Stand with your back flat against a wall. Your upper arm should be against the wall, with your elbow bent 90 degrees (your hand straight ahead). Push your elbow gently back against the wall with about 25% to 50% of your strength. Don't let your body move forward as you push. Hold for about 6 seconds. Relax for a few seconds. Repeat 8 to 12 times. Scapular exercise: Wall push-ups    Note: This exercise is best done with your fingers somewhat turned out, rather than straight up and down. 1. Stand facing a wall, about 12 inches to 18 inches away. 2. Place your hands on the wall at shoulder height. 3. Slowly bend your elbows and bring your face to the wall. Keep your back and hips straight. 4. Push back to where you started. 5. Repeat 8 to 12 times. 6. When you can do this exercise against a wall comfortably, you can try it against a counter.  You can then slowly progress to the end of a couch, then to a sturdy chair, and finally to the floor. Scapular exercise: Retraction    Note: For this exercise, you will need elastic exercise material, such as surgical tubing or Thera-Band. 1. Put the band around a solid object at about waist level. (A bedpost will work well.) Each hand should hold an end of the band. 2. With your elbows at your sides and bent to 90 degrees, pull the band back. Your shoulder blades should move toward each other. Then move your arms back where you started. 3. Repeat 8 to 12 times. 4. If you have good range of motion in your shoulders, try this exercise with your arms lifted out to the sides. Keep your elbows at a 90-degree angle. Raise the elastic band up to about shoulder level. Pull the band back to move your shoulder blades toward each other. Then move your arms back where you started. Internal rotator strengthening exercise    1. Start by tying a piece of elastic exercise material to a doorknob. You can use surgical tubing or Thera-Band. 2. Stand or sit with your shoulder relaxed and your elbow bent 90 degrees. Your upper arm should rest comfortably against your side. Squeeze a rolled towel between your elbow and your body for comfort. This will help keep your arm at your side. 3. Hold one end of the elastic band in the hand of the painful arm. 4. Slowly rotate your forearm toward your body until it touches your belly. Slowly move it back to where you started. 5. Keep your elbow and upper arm firmly tucked against the towel roll or at your side. 6. Repeat 8 to 12 times. External rotator strengthening exercise    1. Start by tying a piece of elastic exercise material to a doorknob. You can use surgical tubing or Thera-Band. (You may also hold one end of the band in each hand.)  2. Stand or sit with your shoulder relaxed and your elbow bent 90 degrees. Your upper arm should rest comfortably against your side.  Squeeze a rolled towel between your elbow and your body for comfort. This will help keep your arm at your side. 3. Hold one end of the elastic band with the hand of the painful arm. 4. Start with your forearm across your belly. Slowly rotate the forearm out away from your body. Keep your elbow and upper arm tucked against the towel roll or the side of your body until you begin to feel tightness in your shoulder. Slowly move your arm back to where you started. 5. Repeat 8 to 12 times. Follow-up care is a key part of your treatment and safety. Be sure to make and go to all appointments, and call your doctor if you are having problems. It's also a good idea to know your test results and keep a list of the medicines you take. Where can you learn more? Go to http://joe-kenzie.info/. Enter Loreta Keller in the search box to learn more about \"Rotator Cuff: Exercises. \"  Current as of: May 23, 2016  Content Version: 11.2  © 0729-4374 [employer]. Care instructions adapted under license by [employer] (which disclaims liability or warranty for this information). If you have questions about a medical condition or this instruction, always ask your healthcare professional. [address] any warranty or liability for your use of this information.

## 2017-05-09 NOTE — PROGRESS NOTES
Allie Askew is a 39 y.o. male  Chief Complaint   Patient presents with    Fall     fell 05/06/17     1. Have you been to the ER, urgent care clinic since your last visit? Hospitalized since your last visit? No    2. Have you seen or consulted any other health care providers outside of the [address] [place] [address] since your last visit? Include any pap smears or colon screening.  No

## 2017-05-09 NOTE — MR AVS SNAPSHOT
Visit Information Date & Time Provider Department Dept. Phone Encounter #  
 5/9/2017  2:15 [phone] [id number] Upcoming Health Maintenance Date Due DTaP/Tdap/Td series (1 - Tdap) 6/14/1996 INFLUENZA AGE 9 TO ADULT 8/1/2017 Allergies as of 5/9/2017  Review Complete On: 4/18/2017 By: Sherice Perez MD  
 No Known Allergies Current Immunizations  Never Reviewed No immunizations on file. Not reviewed this visit You Were Diagnosed With   
  
 Codes Comments Fall, initial encounter    -  Primary ICD-10-CM: W19. Caty Burkinan ICD-9-CM: E888.9 Acute pain of left shoulder     ICD-10-CM: M25.512 ICD-9-CM: 719.41 Vitals BP Pulse Temp Resp Height(growth percentile) Weight(growth percentile) 122/70 (BP 1 Location: Left arm, BP Patient Position: Sitting) (!) 54 98.2 °F (36.8 °C) (Oral) 22 6' (1.829 m) 187 lb (84.8 kg) SpO2 BMI Smoking Status 98% 25.36 kg/m2 Never Smoker BMI and BSA Data Body Mass Index Body Surface Area  
 25.36 kg/m 2 2.08 m 2 Preferred Pharmacy Pharmacy Name Phone [facility] [address] [place] [address] [phone] Your Updated Medication List  
  
   
This list is accurate as of: 5/9/17  2:22 PM.  Always use your most recent med list.  
  
  
  
  
 baclofen 10 mg tablet Commonly known as:  LIORESAL Take  by mouth four (4) times daily. ibuprofen 800 mg tablet Commonly known as:  MOTRIN Take 1 Tab by mouth every eight (8) hours as needed for Pain. INNOPRAN  mg Cp24 Generic drug:  propranolol Take  by mouth. TOPAMAX 25 mg tablet Generic drug:  topiramate Take 12.5 mg by mouth once. trihexyphenidyl 2 mg tablet Commonly known as:  ARTANE Take  by mouth two (2) times a day. Prescriptions Sent to Pharmacy Refills ibuprofen (MOTRIN) 800 mg tablet 0 Sig: Take 1 Tab by mouth every eight (8) hours as needed for Pain. Class: Normal  
 Pharmacy: [facility] [address] [facility] #: [phone] Route: Oral  
  
Patient Instructions Rotator Cuff: Exercises Your Care Instructions Here are some examples of typical rehabilitation exercises for your condition. Start each exercise slowly. Ease off the exercise if you start to have pain. Your doctor or physical therapist will tell you when you can start these exercises and which ones will work best for you. How to do the exercises Pendulum swing Note: If you have pain in your back, do not do this exercise. 1. Hold on to a table or the back of a chair with your good arm. Then bend forward a little and let your sore arm hang straight down. This exercise does not use the arm muscles. Rather, use your legs and your hips to create movement that makes your arm swing freely. 2. Use the movement from your hips and legs to guide the slightly swinging arm back and forth like a pendulum (or elephant trunk). Then guide it in circles that start small (about the size of a dinner plate). Make the circles a bit larger each day, as your pain allows. 3. Do this exercise for 5 minutes, 5 to 7 times each day. 4. As you have less pain, try bending over a little farther to do this exercise. This will increase the amount of movement at your shoulder. Posterior stretching exercise 1. Hold the elbow of your injured arm with your other hand. 2. Use your hand to pull your injured arm gently up and across your body. You will feel a gentle stretch across the back of your injured shoulder. 3. Hold for at least 15 to 30 seconds. Then slowly lower your arm. 4. Repeat 2 to 4 times. Up-the-back stretch Note: Your doctor or physical therapist may want you to wait to do this stretch until you have regained most of your range of motion and strength. You can do this stretch in different ways. Hold any of these stretches for at least 15 to 30 seconds. Repeat them 2 to 4 times. 1. Put your hand in your back pocket. Let it rest there to stretch your shoulder. 2. With your other hand, hold your injured arm (palm outward) behind your back by the wrist. Pull your arm up gently to stretch your shoulder. 3. Next, put a towel over your other shoulder. Put the hand of your injured arm behind your back. Now hold the back end of the towel. With the other hand, hold the front end of the towel in front of your body. Pull gently on the front end of the towel. This will bring your hand farther up your back to stretch your shoulder. Overhead stretch 1. Standing about an arm's length away, grasp onto a solid surface. You could use a countertop, a doorknob, or the back of a sturdy chair. 2. With your knees slightly bent, bend forward with your arms straight. Lower your upper body, and let your shoulders stretch. 3. As your shoulders are able to stretch farther, you may need to take a step or two backward. 4. Hold for at least 15 to 30 seconds. Then stand up and relax. If you had stepped back during your stretch, step forward so you can keep your hands on the solid surface. 5. Repeat 2 to 4 times. Shoulder flexion (lying down) Note: To make a wand for this exercise, use a piece of PVC pipe or a broom handle with the broom removed. Make the wand about a foot wider than your shoulders. 1. Lie on your back, holding a wand with both hands. Your palms should face down as you hold the wand. 2. Keeping your elbows straight, slowly raise your arms over your head. Raise them until you feel a stretch in your shoulders, upper back, and chest. 
3. Hold for 15 to 30 seconds. 4. Repeat 2 to 4 times. Shoulder rotation (lying down) Note: To make a wand for this exercise, use a piece of PVC pipe or a broom handle with the broom removed. Make the wand about a foot wider than your shoulders. 1. Lie on your back. Hold a wand with both hands with your elbows bent and palms up. 2. Keep your elbows close to your body, and move the wand across your body toward the sore arm. 3. Hold for 8 to 12 seconds. 4. Repeat 2 to 4 times. Wall climbing (to the side) Note: Avoid any movement that is straight to your side, and be careful not to arch your back. Your arm should stay about 30 degrees to the front of your side. 1. Stand with your side to a wall so that your fingers can just touch it at an angle about 30 degrees toward the front of your body. 2. Walk the fingers of your injured arm up the wall as high as pain permits. Try not to shrug your shoulder up toward your ear as you move your arm up. 3. Hold that position for a count of at least 15 to 20. 
4. Walk your fingers back down to the starting position. 5. Repeat at least 2 to 4 times. Try to reach higher each time. Wall climbing (to the front) Note: During this stretching exercise, be careful not to arch your back. 1. Face a wall, and stand so your fingers can just touch it. 2. Keeping your shoulder down, walk the fingers of your injured arm up the wall as high as pain permits. (Don't shrug your shoulder up toward your ear.) 3. Hold your arm in that position for at least 15 to 30 seconds. 4. Slowly walk your fingers back down to where you started. 5. Repeat at least 2 to 4 times. Try to reach higher each time. Shoulder blade squeeze 1. Stand with your arms at your sides, and squeeze your shoulder blades together. Do not raise your shoulders up as you squeeze. 2. Hold 6 seconds. 3. Repeat 8 to 12 times. Scapular exercise: Arm reach 1. Lie flat on your back. This exercise is a very slight motion that starts with your arms raised (elbows straight, arms straight). 2. From this position, reach higher toward the cuco or ceiling. Keep your elbows straight. All motion should be from your shoulder blade only. 3. Relax your arms back to where you started. 4. Repeat 8 to 12 times. Arm raise to the side Note: During this strengthening exercise, your arm should stay about 30 degrees to the front of your side. 1. Slowly raise your injured arm to the side, with your thumb facing up. Raise your arm 60 degrees at the most (shoulder level is 90 degrees). 2. Hold the position for 3 to 5 seconds. Then lower your arm back to your side. If you need to, bring your \"good\" arm across your body and place it under the elbow as you lower your injured arm. Use your good arm to keep your injured arm from dropping down too fast. 
3. Repeat 8 to 12 times. 4. When you first start out, don't hold any extra weight in your hand. As you get stronger, you may use a 1-pound to 2-pound dumbbell or a small can of food. Shoulder flexor and extensor exercise Note: These are isometric exercises. That means you contract your muscles without actually moving. · Push forward (flex): Stand facing a wall or doorjamb, about 6 inches or less back. Hold your injured arm against your body. Make a closed fist with your thumb on top. Then gently push your hand forward into the wall with about 25% to 50% of your strength. Don't let your body move backward as you push. Hold for about 6 seconds. Relax for a few seconds. Repeat 8 to 12 times. · Push backward (extend): Stand with your back flat against a wall. Your upper arm should be against the wall, with your elbow bent 90 degrees (your hand straight ahead). Push your elbow gently back against the wall with about 25% to 50% of your strength. Don't let your body move forward as you push. Hold for about 6 seconds. Relax for a few seconds. Repeat 8 to 12 times. Scapular exercise: Wall push-ups Note: This exercise is best done with your fingers somewhat turned out, rather than straight up and down. 1. Stand facing a wall, about 12 inches to 18 inches away. 2. Place your hands on the wall at shoulder height. 3. Slowly bend your elbows and bring your face to the wall. Keep your back and hips straight. 4. Push back to where you started. 5. Repeat 8 to 12 times. 6. When you can do this exercise against a wall comfortably, you can try it against a counter. You can then slowly progress to the end of a couch, then to a sturdy chair, and finally to the floor. Scapular exercise: Retraction Note: For this exercise, you will need elastic exercise material, such as surgical tubing or Thera-Band. 1. Put the band around a solid object at about waist level. (A bedpost will work well.) Each hand should hold an end of the band. 2. With your elbows at your sides and bent to 90 degrees, pull the band back. Your shoulder blades should move toward each other. Then move your arms back where you started. 3. Repeat 8 to 12 times. 4. If you have good range of motion in your shoulders, try this exercise with your arms lifted out to the sides. Keep your elbows at a 90-degree angle. Raise the elastic band up to about shoulder level. Pull the band back to move your shoulder blades toward each other. Then move your arms back where you started. Internal rotator strengthening exercise 1. Start by tying a piece of elastic exercise material to a doorknob. You can use surgical tubing or Thera-Band. 2. Stand or sit with your shoulder relaxed and your elbow bent 90 degrees. Your upper arm should rest comfortably against your side. Squeeze a rolled towel between your elbow and your body for comfort. This will help keep your arm at your side. 3. Hold one end of the elastic band in the hand of the painful arm. 4. Slowly rotate your forearm toward your body until it touches your belly. Slowly move it back to where you started. 5. Keep your elbow and upper arm firmly tucked against the towel roll or at your side. 6. Repeat 8 to 12 times. External rotator strengthening exercise 1. Start by tying a piece of elastic exercise material to a doorknob. You can use surgical tubing or Thera-Band. (You may also hold one end of the band in each hand.) 2. Stand or sit with your shoulder relaxed and your elbow bent 90 degrees. Your upper arm should rest comfortably against your side. Squeeze a rolled towel between your elbow and your body for comfort. This will help keep your arm at your side. 3. Hold one end of the elastic band with the hand of the painful arm. 4. Start with your forearm across your belly. Slowly rotate the forearm out away from your body. Keep your elbow and upper arm tucked against the towel roll or the side of your body until you begin to feel tightness in your shoulder. Slowly move your arm back to where you started. 5. Repeat 8 to 12 times. Follow-up care is a key part of your treatment and safety. Be sure to make and go to all appointments, and call your doctor if you are having problems. It's also a good idea to know your test results and keep a list of the medicines you take. Where can you learn more? Go to http://joe-kenzie.info/. Enter Janet Naylor in the search box to learn more about \"Rotator Cuff: Exercises. \" Current as of: May 23, 2016 Content Version: 11.2 © 8320-5572 [employer]. Care instructions adapted under license by [employer] (which disclaims liability or warranty for this information). If you have questions about a medical condition or this instruction, always ask your healthcare professional. [address] any warranty or liability for your use of this information. Introducing [facility]!    
 Toribio Nyhan introduces [employer] patient portal. Now you can access parts of your medical record, email your doctor's office, and request medication refills online. 1. In your internet browser, go to [email or web address] 2. Click on the First Time User? Click Here link in the Sign In box. You will see the New Member Sign Up page. 3. Enter your [employer] Access Code exactly as it appears below. You will not need to use this code after youve completed the sign-up process. If you do not sign up before the expiration date, you must request a new code. · [employer] Access Code: [email or web address] Expires: 6/12/2017  1:55 PM 
 
4. Enter the last four digits of your Social Security Number (xxxx) and Date of Birth (mm/dd/yyyy) as indicated and click Submit. You will be taken to the next sign-up page. 5. Create a [employer] ID. This will be your [employer] login ID and cannot be changed, so think of one that is secure and easy to remember. 6. Create a [employer] password. You can change your password at any time. 7. Enter your Password Reset Question and Answer. This can be used at a later time if you forget your password. 8. Enter your e-mail address. You will receive e-mail notification when new information is available in [address] 9. Click Sign Up. You can now view and download portions of your medical record. 10. Click the Download Summary menu link to download a portable copy of your medical information. If you have questions, please visit the Frequently Asked Questions section of the [employer] website. Remember, [employer] is NOT to be used for urgent needs. For medical emergencies, dial 911. Now available from your iPhone and Android! Please provide this summary of care documentation to your next provider. Your primary care clinician is listed as [address] 13. If you have any questions after today's visit, please call [phone].

## 2017-05-09 NOTE — PROGRESS NOTES
Assessment/Plan:    1. Fall, initial encounter    2. Acute pain of left shoulder  -home exercises. Monitor. If not better in 2-3 weeks, RTO and will do xray and ortho referral or PT  - ibuprofen (MOTRIN) 800 mg tablet; Take 1 Tab by mouth every eight (8) hours as needed for Pain. Dispense: 60 Tab; Refill: 0    The plan was discussed with the patient. The patient verbalized understanding and is in agreement with the plan. All medication potential side effects were discussed with the patient. Health Maintenance:   Health Maintenance   Topic Date Due    DTaP/Tdap/Td series (1 - Tdap) 06/14/1996    INFLUENZA AGE 9 TO ADULT  08/01/2017     Brandi Quiros is a 39 y.o. male and presents with Fall (fell 05/06/17)     Subjective:  [place]. States he was walking down a ramp and fell. He thinks he fell b/c he tripped on the uneven area of the dock/pavement. At the time, he was carrying soda under his R arm. He landed on his chin and L shoulder. Since then, has been having L shoulder pain. Has been taking advil and heating pad with some improvement. He has a remote h/o rotator cuff tear and ruptured biceps tendon on that side. Pain is 2/10. Pain is only with movement, specifically adduction of LUE or with making circular movements with shoulder. No pain with supination. He states his ophthalmologist was going to give him augmentin 875mg. However, he only picked up 500mg. He didn't contact the eye doctor. ROS:  Constitutional: No recent weight change. No weakness/fatigue. No f/c. Cardiovascular: No CP/palpitations. No JOHNSON/orthopnea/PND. Respiratory: No cough/sputum, dyspnea, wheezing. Gastointestinal: No dysphagia, reflux. No n/v. No constipation/diarrhea. No melena/rectal bleeding. Musculoskeletal: + joint pain/stiffness. No muscle pain/tenderness. Neurological: No seizures/numbness/weakness. No paresthesias.      The problem list was updated as a part of today's visit. Patient Active Problem List   Diagnosis Code    Traumatic brain injury with loss of consciousness ([facility] [address] S06. 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 SH:  Social History   Substance Use Topics    Smoking status: Never Smoker    Smokeless tobacco: Never Used    Alcohol use Yes      Comment: socially       Medications/Allergies:  Current Outpatient Prescriptions on File Prior to Visit   Medication Sig Dispense Refill    trihexyphenidyl (ARTANE) 2 mg tablet Take  by mouth two (2) times a day.  propranolol (INNOPRAN XL) 120 mg cp24 Take  by mouth.  baclofen (LIORESAL) 10 mg tablet Take  by mouth four (4) times daily.  topiramate (TOPAMAX) 25 mg tablet Take 12.5 mg by mouth once. No current facility-administered medications on file prior to visit. No Known Allergies    Objective:  Visit Vitals    /70 (BP 1 Location: Left arm, BP Patient Position: Sitting)    Pulse (!) 54    Temp 98.2 °F (36.8 °C) (Oral)    Resp 22    Ht 6' (1.829 m)    Wt 187 lb (84.8 kg)    SpO2 98%    BMI 25.36 kg/m2      Constitutional: Well developed, nourished, no distress, alert   CV: S1, S2.  RRR. No murmurs/rubs. No thrills palpated. No carotid bruits. Intact distal pulses. No edema. Pulm: No abnormalities on inspection. Clear to auscultation bilaterally. No wheezing/rhonchi. Normal effort. MS: Gait normal.  Normal ROM of L shoulder. No pain over deltoid bursa, triceps tendon. +ruptured L biceps tendon (old). Neuro: A/O x 3. No focal motor or sensory deficits. Speech normal.   Psych: Appropriate affect, judgement and insight. Short-term memory intact. R pupil dilated.   Labwork and Ancillary Studies:    CBC w/Diff  Lab Results   Component Value Date/Time    WBC 6.1 04/03/2017 07:24 AM    HGB 15.5 04/03/2017 07:24 AM PLATELET 613 36/82/2697 07:24 AM         Basic Metabolic Profile/LFTs  Lab Results   Component Value Date/Time    Sodium 142 04/03/2017 07:24 AM    Potassium 4.3 04/03/2017 07:24 AM    Chloride 107 04/03/2017 07:24 AM    CO2 25 04/03/2017 07:24 AM    Anion gap 10 04/03/2017 07:24 AM    Glucose 92 04/03/2017 07:24 AM    BUN 19 04/03/2017 07:24 AM    Creatinine 0.84 04/03/2017 07:24 AM    BUN/Creatinine ratio 23 04/03/2017 07:24 AM    GFR est AA >60 04/03/2017 07:24 AM    GFR est non-AA >60 04/03/2017 07:24 AM    Calcium 8.3 04/03/2017 07:24 AM      Lab Results   Component Value Date/Time    ALT (SGPT) 26 04/03/2017 07:24 AM    AST (SGOT) 16 04/03/2017 07:24 AM    Alk.  phosphatase 65 04/03/2017 07:24 AM    Bilirubin, total 0.5 04/03/2017 07:24 AM       Cholesterol  Lab Results   Component Value Date/Time    Cholesterol, total 179 04/03/2017 07:24 AM    HDL Cholesterol 49 04/03/2017 07:24 AM    LDL, calculated 113 04/03/2017 07:24 AM    Triglyceride 85 04/03/2017 07:24 AM    CHOL/HDL Ratio 3.7 04/03/2017 07:24 AM

## 2017-06-20 ENCOUNTER — TELEPHONE (OUTPATIENT)
Dept: FAMILY MEDICINE CLINIC | Age: 42
End: 2017-06-20

## 2017-06-21 NOTE — TELEPHONE ENCOUNTER
Pt called because he wanted to know what his blood work was. Per note the labs were discussed at 4/18 ov. He states that he forgot the labs were already discussed with him. He would like a call back to discuss the lab results, please advise.

## 2017-09-22 ENCOUNTER — OFFICE VISIT (OUTPATIENT)
Dept: FAMILY MEDICINE CLINIC | Age: 42
End: 2017-09-22

## 2017-09-22 VITALS
DIASTOLIC BLOOD PRESSURE: 64 MMHG | HEART RATE: 67 BPM | TEMPERATURE: 99.3 F | HEIGHT: 72 IN | RESPIRATION RATE: 18 BRPM | SYSTOLIC BLOOD PRESSURE: 110 MMHG | OXYGEN SATURATION: 97 % | WEIGHT: 181.4 LBS | BODY MASS INDEX: 24.57 KG/M2

## 2017-09-22 DIAGNOSIS — B35.4 TINEA CORPORIS: Primary | ICD-10-CM

## 2017-09-22 RX ORDER — TERBINAFINE HYDROCHLORIDE 250 MG/1
250 TABLET ORAL DAILY
Qty: 14 TAB | Refills: 0 | Status: SHIPPED | OUTPATIENT
Start: 2017-09-22 | End: 2017-10-09 | Stop reason: ALTCHOICE

## 2017-09-22 NOTE — MR AVS SNAPSHOT
Visit Information Date & Time Provider Department Dept. Phone Encounter #  
 9/22/2017 11:45 AM Jihan Choe, 3 [facility] [phone] [id number] Upcoming Health Maintenance Date Due DTaP/Tdap/Td series (1 - Tdap) 6/14/1996 Allergies as of 9/22/2017  Review Complete On: 9/22/2017 By: Jihan Choe MD  
 No Known Allergies Current Immunizations  Never Reviewed Name Date Influenza Vaccine 9/1/2017 Not reviewed this visit You Were Diagnosed With   
  
 Codes Comments Tinea corporis    -  Primary ICD-10-CM: B35.4 ICD-9-CM: 110.5 Vitals BP Pulse Temp Resp Height(growth percentile) Weight(growth percentile) 110/64 (BP 1 Location: Left arm, BP Patient Position: Sitting) 67 99.3 °F (37.4 °C) (Oral) 18 6' (1.829 m) 181 lb 6.4 oz (82.3 kg) SpO2 BMI Smoking Status 97% 24.6 kg/m2 Never Smoker Vitals History BMI and BSA Data Body Mass Index Body Surface Area  
 24.6 kg/m 2 2.04 m 2 Preferred Pharmacy Pharmacy Name Phone [facility] [address] [place]  [phone] Your Updated Medication List  
  
   
This list is accurate as of: 9/22/17 11:55 AM.  Always use your most recent med list.  
  
  
  
  
 baclofen 10 mg tablet Commonly known as:  LIORESAL Take  by mouth four (4) times daily. ibuprofen 800 mg tablet Commonly known as:  MOTRIN Take 1 Tab by mouth every eight (8) hours as needed for Pain. INNOPRAN  mg Cp24 Generic drug:  propranolol Take  by mouth. terbinafine HCl 250 mg tablet Commonly known as:  LAMISIL Take 1 Tab by mouth daily. TOPAMAX 25 mg tablet Generic drug:  topiramate Take 12.5 mg by mouth once. trihexyphenidyl 2 mg tablet Commonly known as:  ARTANE Take  by mouth two (2) times a day. Prescriptions Sent to Pharmacy Refills terbinafine HCl (LAMISIL) 250 mg tablet 0 Sig: Take 1 Tab by mouth daily. Class: Normal  
 Pharmacy: [address] [place] [address], [address] [place] [address] #: [phone] Route: Oral  
  
Patient Instructions Ringworm: Care Instructions Your Care Instructions Ringworm is a fungus infection of the skin. It is not caused by a worm. Ringworm causes a round, scaly rash that may crack and itch. The rash can spread over a wide area. One type of fungus that causes ringworm is often found in locker rooms and swimming pools. It grows well in warm, moist areas of the skin, such as in skin folds. You can get ringworm by sharing towels, clothing, and sports equipment. You can also get it by touching someone who has ringworm. Ringworm is treated with cream that kills the fungus. If the rash is widespread, you may need pills to get rid of it. Ringworm often comes back after treatment. If the rash becomes infected with bacteria, you may need antibiotics. Follow-up care is a key part of your treatment and safety. Be sure to make and go to all appointments, and call your doctor if you are having problems. It's also a good idea to know your test results and keep a list of the medicines you take. How can you care for yourself at home? · Take your medicines exactly as prescribed. Call your doctor if you have any problems with your medicine. · Wash the rash with soap and water, remove flaky skin, and dry thoroughly. · Try an over-the-counter cream with clotrimazole or miconazole in it. Brand names include Lotrimin, Micatin, and Tinactin. Terbinafine cream (Lamisil) is also available without a prescription. Spread the cream beyond the edge or border of the rash. Follow the directions on the package. Do not stop using the medicine just because your skin clears up. You will probably need to continue treatment for 2 to 4 weeks. · To keep from getting another infection: ¨ Do not go barefoot in public places such as gyms or locker rooms. Avoid sharing towels and clothes. Use flip-flops or some other type of shoe in the shower. ¨ Do not wear tight clothes or let your skin stay damp for long periods, such as by staying in a wet bathing suit or sweaty clothes. When should you call for help? Call your doctor now or seek immediate medical care if: 
· You have signs of infection such as: 
¨ Pain, warmth, or swelling in your skin. ¨ Red streaks near a wound in the skin. ¨ Pus coming from the rash on your skin. ¨ A fever. Watch closely for changes in your health, and be sure to contact your doctor if: 
· Your ringworm does not improve after 2 weeks of treatment. · You do not get better as expected. Where can you learn more? Go to http://joe-kenzie.info/. Enter C621 in the search box to learn more about \"Ringworm: Care Instructions. \" Current as of: October 13, 2016 Content Version: 11.3 © 3970-7517 [employer]. Care instructions adapted under license by [employer] (which disclaims liability or warranty for this information). If you have questions about a medical condition or this instruction, always ask your healthcare professional. [address] any warranty or liability for your use of this information. Introducing [facility] & HEALTH SERVICES! [facility] introduces [employer] patient portal. Now you can access parts of your medical record, email your doctor's office, and request medication refills online. 1. In your internet browser, go to [email or web address]/"[email or web address]" 2. Click on the First Time User? Click Here link in the Sign In box. You will see the New Member Sign Up page. 3. Enter your [employer] Access Code exactly as it appears below. You will not need to use this code after youve completed the sign-up process. If you do not sign up before the expiration date, you must request a new code. · eduPad Access Code: [email or web address] Expires: 12/21/2017 10:59 AM 
 
4. Enter the last four digits of your Social Security Number (xxxx) and Date of Birth (mm/dd/yyyy) as indicated and click Submit. You will be taken to the next sign-up page. 5. Create a eduPad ID. This will be your eduPad login ID and cannot be changed, so think of one that is secure and easy to remember. 6. Create a eduPad password. You can change your password at any time. 7. Enter your Password Reset Question and Answer. This can be used at a later time if you forget your password. 8. Enter your e-mail address. You will receive e-mail notification when new information is available in [address] 9. Click Sign Up. You can now view and download portions of your medical record. 10. Click the Download Summary menu link to download a portable copy of your medical information. If you have questions, please visit the Frequently Asked Questions section of the eduPad website. Remember, eduPad is NOT to be used for urgent needs. For medical emergencies, dial 911. Now available from your iPhone and Android! Please provide this summary of care documentation to your next provider. Your primary care clinician is listed as Kade 13. If you have any questions after today's visit, please call [phone].

## 2017-09-22 NOTE — PROGRESS NOTES
Assessment/Plan:    1. Tinea corporis  - terbinafine HCl (LAMISIL) 250 mg tablet; Take 1 Tab by mouth daily. Dispense: 14 Tab; Refill: 0    The plan was discussed with the patient. The patient verbalized understanding and is in agreement with the plan. All medication potential side effects were discussed with the patient. Health Maintenance:   Health Maintenance   Topic Date Due    DTaP/Tdap/Td series (1 - Tdap) 06/14/1996    INFLUENZA AGE 9 TO ADULT  Addressed       Anjel Miner is a 43 y.o. male and presents with Rash     Subjective:  Pt c/o rash. States he's been to derm in [place]. Previously dx with fungal infection and given liquid soap/shampoo (he doesn't remember the name). However, application was very difficult given his musculoskeletal issues (RUE contracture). ROS:  Constitutional: No recent weight change. No weakness/fatigue. No f/c. Skin: +rashes, change in nails/hair, itching   Cardiovascular: No CP/palpitations. No JOHNSON/orthopnea/PND. Respiratory: No cough/sputum, dyspnea, wheezing. The problem list was updated as a part of today's visit. Patient Active Problem List   Diagnosis Code    Traumatic brain injury with loss of consciousness ([facility] [address] S06. 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 SH:  Social History   Substance Use Topics    Smoking status: Never Smoker    Smokeless tobacco: Never Used    Alcohol use Yes      Comment: socially       Medications/Allergies:  Current Outpatient Prescriptions on File Prior to Visit   Medication Sig Dispense Refill    ibuprofen (MOTRIN) 800 mg tablet Take 1 Tab by mouth every eight (8) hours as needed for Pain. 60 Tab 0    trihexyphenidyl (ARTANE) 2 mg tablet Take  by mouth two (2) times a day.  propranolol (INNOPRAN XL) 120 mg cp24 Take  by mouth.       baclofen (LIORESAL) 10 mg tablet Take  by mouth four (4) times daily.  topiramate (TOPAMAX) 25 mg tablet Take 12.5 mg by mouth once. No current facility-administered medications on file prior to visit. No Known Allergies    Objective:  Visit Vitals    /64 (BP 1 Location: Left arm, BP Patient Position: Sitting)    Pulse 67    Temp 99.3 °F (37.4 °C) (Oral)    Resp 18    Ht 6' (1.829 m)    Wt 181 lb 6.4 oz (82.3 kg)    SpO2 97%    BMI 24.6 kg/m2      Constitutional: Well developed, nourished, no distress, alert   CV: S1, S2.  RRR. No murmurs/rubs. No thrills palpated. No carotid bruits. Intact distal pulses. No edema. Pulm: No abnormalities on inspection. Clear to auscultation bilaterally. No wheezing/rhonchi. Normal effort. Skin: Extensive area of tinea (chest, back, arms)     Labwork and Ancillary Studies:    CBC w/Diff  Lab Results   Component Value Date/Time    WBC 6.1 04/03/2017 07:24 AM    HGB 15.5 04/03/2017 07:24 AM    PLATELET 714 31/48/3207 07:24 AM         Basic Metabolic Profile/LFTs  Lab Results   Component Value Date/Time    Sodium 142 04/03/2017 07:24 AM    Potassium 4.3 04/03/2017 07:24 AM    Chloride 107 04/03/2017 07:24 AM    CO2 25 04/03/2017 07:24 AM    Anion gap 10 04/03/2017 07:24 AM    Glucose 92 04/03/2017 07:24 AM    BUN 19 04/03/2017 07:24 AM    Creatinine 0.84 04/03/2017 07:24 AM    BUN/Creatinine ratio 23 04/03/2017 07:24 AM    GFR est AA >60 04/03/2017 07:24 AM    GFR est non-AA >60 04/03/2017 07:24 AM    Calcium 8.3 04/03/2017 07:24 AM      Lab Results   Component Value Date/Time    ALT (SGPT) 26 04/03/2017 07:24 AM    AST (SGOT) 16 04/03/2017 07:24 AM    Alk.  phosphatase 65 04/03/2017 07:24 AM    Bilirubin, total 0.5 04/03/2017 07:24 AM       Cholesterol  Lab Results   Component Value Date/Time    Cholesterol, total 179 04/03/2017 07:24 AM    HDL Cholesterol 49 04/03/2017 07:24 AM    LDL, calculated 113 04/03/2017 07:24 AM    Triglyceride 85 04/03/2017 07:24 AM    CHOL/HDL Ratio 3.7 04/03/2017 07:24 AM

## 2017-09-22 NOTE — PROGRESS NOTES
1. Have you been to the ER, urgent care clinic since your last visit? Hospitalized since your last visit? Yes When: 7/10/17 [facility] [address] [facility]    2. Have you seen or consulted any other health care providers outside of the [address] [place] [address] since your last visit? Include any pap smears or colon screening.  No

## 2017-09-22 NOTE — PATIENT INSTRUCTIONS
Ringworm: Care Instructions  Your Care Instructions  Ringworm is a fungus infection of the skin. It is not caused by a worm. Ringworm causes a round, scaly rash that may crack and itch. The rash can spread over a wide area. One type of fungus that causes ringworm is often found in locker rooms and swimming pools. It grows well in warm, moist areas of the skin, such as in skin folds. You can get ringworm by sharing towels, clothing, and sports equipment. You can also get it by touching someone who has ringworm. Ringworm is treated with cream that kills the fungus. If the rash is widespread, you may need pills to get rid of it. Ringworm often comes back after treatment. If the rash becomes infected with bacteria, you may need antibiotics. Follow-up care is a key part of your treatment and safety. Be sure to make and go to all appointments, and call your doctor if you are having problems. It's also a good idea to know your test results and keep a list of the medicines you take. How can you care for yourself at home? · Take your medicines exactly as prescribed. Call your doctor if you have any problems with your medicine. · Wash the rash with soap and water, remove flaky skin, and dry thoroughly. · Try an over-the-counter cream with clotrimazole or miconazole in it. Brand names include Lotrimin, Micatin, and Tinactin. Terbinafine cream (Lamisil) is also available without a prescription. Spread the cream beyond the edge or border of the rash. Follow the directions on the package. Do not stop using the medicine just because your skin clears up. You will probably need to continue treatment for 2 to 4 weeks. · To keep from getting another infection:  ¨ Do not go barefoot in public places such as gyms or locker rooms. Avoid sharing towels and clothes. Use flip-flops or some other type of shoe in the shower.   ¨ Do not wear tight clothes or let your skin stay damp for long periods, such as by staying in a wet bathing suit or sweaty clothes. When should you call for help? Call your doctor now or seek immediate medical care if:  · You have signs of infection such as:  ¨ Pain, warmth, or swelling in your skin. ¨ Red streaks near a wound in the skin. ¨ Pus coming from the rash on your skin. ¨ A fever. Watch closely for changes in your health, and be sure to contact your doctor if:  · Your ringworm does not improve after 2 weeks of treatment. · You do not get better as expected. Where can you learn more? Go to http://joe-kenzie.info/. Enter W649 in the search box to learn more about \"Ringworm: Care Instructions. \"  Current as of: October 13, 2016  Content Version: 11.3  © 8521-3827 [employer]. Care instructions adapted under license by [employer] (which disclaims liability or warranty for this information). If you have questions about a medical condition or this instruction, always ask your healthcare professional. [place] [address] any warranty or liability for your use of this information.

## 2017-10-06 ENCOUNTER — TELEPHONE (OUTPATIENT)
Dept: FAMILY MEDICINE CLINIC | Age: 42
End: 2017-10-06

## 2017-10-09 RX ORDER — FLUCONAZOLE 150 MG/1
150 TABLET ORAL
Qty: 4 TAB | Refills: 0 | Status: SHIPPED | OUTPATIENT
Start: 2017-10-09 | End: 2017-12-13 | Stop reason: SDUPTHER

## 2017-11-01 ENCOUNTER — OFFICE VISIT (OUTPATIENT)
Dept: FAMILY MEDICINE CLINIC | Age: 42
End: 2017-11-01

## 2017-11-01 VITALS
OXYGEN SATURATION: 97 % | BODY MASS INDEX: 24.92 KG/M2 | TEMPERATURE: 98.3 F | RESPIRATION RATE: 18 BRPM | HEIGHT: 72 IN | HEART RATE: 71 BPM | SYSTOLIC BLOOD PRESSURE: 122 MMHG | WEIGHT: 184 LBS | DIASTOLIC BLOOD PRESSURE: 88 MMHG

## 2017-11-01 DIAGNOSIS — H61.23 IMPACTED CERUMEN OF BOTH EARS: Primary | ICD-10-CM

## 2017-11-01 NOTE — MR AVS SNAPSHOT
Visit Information Date & Time Provider Department Dept. Phone Encounter #  
 11/1/2017  2:00 PM Jo Pike, 3 [facility] [phone] [id number] Follow-up Instructions Return in about 6 months (around 5/1/2018). Upcoming Health Maintenance Date Due DTaP/Tdap/Td series (1 - Tdap) 6/14/1996 Allergies as of 11/1/2017  Review Complete On: 11/1/2017 By: Jo Pike MD  
 No Known Allergies Current Immunizations  Never Reviewed Name Date Influenza Vaccine 9/1/2017 Not reviewed this visit You Were Diagnosed With   
  
 Codes Comments Impacted cerumen of both ears    -  Primary ICD-10-CM: H61.23 
ICD-9-CM: 380.4 Vitals BP Pulse Temp Resp Height(growth percentile) Weight(growth percentile) 122/88 (BP 1 Location: Left arm, BP Patient Position: Sitting) 71 98.3 °F (36.8 °C) (Oral) 18 6' (1.829 m) 184 lb (83.5 kg) SpO2 BMI Smoking Status 97% 24.95 kg/m2 Never Smoker Vitals History BMI and BSA Data Body Mass Index Body Surface Area 24.95 kg/m 2 2.06 m 2 Preferred Pharmacy Pharmacy Name Phone [facility] [address] [place] [address] [phone] Your Updated Medication List  
  
   
This list is accurate as of: 11/1/17  2:24 PM.  Always use your most recent med list.  
  
  
  
  
 baclofen 10 mg tablet Commonly known as:  LIORESAL Take  by mouth four (4) times daily. ibuprofen 800 mg tablet Commonly known as:  MOTRIN Take 1 Tab by mouth every eight (8) hours as needed for Pain. INNOPRAN  mg Cp24 Generic drug:  propranolol Take  by mouth. TOPAMAX 25 mg tablet Generic drug:  topiramate Take 12.5 mg by mouth once. trihexyphenidyl 2 mg tablet Commonly known as:  ARTANE Take  by mouth two (2) times a day. Follow-up Instructions Return in about 6 months (around 5/1/2018). To-Do List   
 11/01/2017 Procedures:  REMOVAL IMPACTED CERUMEN IRRIGATION/LVG UNILAT Patient Instructions Earwax Blockage: Care Instructions Your Care Instructions Earwax is a natural substance that protects the ear canal. Normally, earwax drains from the ears and does not cause problems. Sometimes earwax builds up and hardens. Earwax blockage (also called cerumen impaction) can cause some loss of hearing and pain. When wax is tightly packed, you will need to have your doctor remove it. Follow-up care is a key part of your treatment and safety. Be sure to make and go to all appointments, and call your doctor if you are having problems. It's also a good idea to know your test results and keep a list of the medicines you take. How can you care for yourself at home? · Do not try to remove earwax with cotton swabs, fingers, or other objects. This can make the blockage worse and damage the eardrum. · If your doctor recommends that you try to remove earwax at home: ¨ Soften and loosen the earwax with warm mineral oil. You also can try hydrogen peroxide mixed with an equal amount of room temperature water. Place 2 drops of the fluid, warmed to body temperature, in the ear two times a day for up to 5 days. ¨ Once the wax is loose and soft, all that is usually needed to remove it from the ear canal is a gentle, warm shower. Direct the water into the ear, then tip your head to let the earwax drain out. Dry your ear thoroughly with a hair dryer set on low. Hold the dryer several inches from your ear. ¨ If the warm mineral oil and shower do not work, use an over-the-counter wax softener followed by gentle flushing with an ear syringe each night for a week or two. Make sure the flushing solution is body temperature. Cool or hot fluids in the ear can cause dizziness. When should you call for help? Call your doctor now or seek immediate medical care if: 
? · Pus or blood drains from your ear. ? · Your ears are ringing or feel full. ? · You have a loss of hearing. ? Watch closely for changes in your health, and be sure to contact your doctor if: 
? · You have pain or reduced hearing after 1 week of home treatment. ? · You have any new symptoms, such as nausea or balance problems. Where can you learn more? Go to http://joe-kenzie.info/. Enter I213 in the search box to learn more about \"Earwax Blockage: Care Instructions. \" Current as of: March 20, 2017 Content Version: 11.4 © 1339-1598 [employer]. Care instructions adapted under license by [employer] (which disclaims liability or warranty for this information). If you have questions about a medical condition or this instruction, always ask your healthcare professional. [address] any warranty or liability for your use of this information. Introducing [facility] & HEALTH SERVICES! [facility] introduces [employer] patient portal. Now you can access parts of your medical record, email your doctor's office, and request medication refills online. 1. In your internet browser, go to [email or web address] 2. Click on the First Time User? Click Here link in the Sign In box. You will see the New Member Sign Up page. 3. Enter your Symetis Access Code exactly as it appears below. You will not need to use this code after youve completed the sign-up process. If you do not sign up before the expiration date, you must request a new code. · [employer]is Access Code: [email or web address] Expires: 12/21/2017 10:59 AM 
 
4. Enter the last four digits of your Social Security Number (xxxx) and Date of Birth (mm/dd/yyyy) as indicated and click Submit. You will be taken to the next sign-up page. 5. Create a Symetis ID. This will be your Symetis login ID and cannot be changed, so think of one that is secure and easy to remember. 6. Create a MarketMeSuite password. You can change your password at any time. 7. Enter your Password Reset Question and Answer. This can be used at a later time if you forget your password. 8. Enter your e-mail address. You will receive e-mail notification when new information is available in [address] 9. Click Sign Up. You can now view and download portions of your medical record. 10. Click the Download Summary menu link to download a portable copy of your medical information. If you have questions, please visit the Frequently Asked Questions section of the MarketMe[employer] website. Remember, [employer]MeSuite is NOT to be used for urgent needs. For medical emergencies, dial 911. Now available from your iPhone and Android! Please provide this summary of care documentation to your next provider. Your primary care clinician is listed as Kade 13. If you have any questions after today's visit, please call [phone].

## 2017-11-01 NOTE — PROGRESS NOTES
Chief Complaint   Patient presents with    Wax in Ear     Assessment/Plan  1. Impacted cerumen of both ears  =removed  By lavage. Tolerated well. - REMOVAL IMPACTED CERUMEN IRRIGATION/LVG UNILAT; Future    The plan was discussed with the patient. The patient verbalized understanding and is in agreement with the plan. All medication potential side effects were discussed with the patient. SUBJECTIVE:   Watson Galdamez is a 43 y.o. male who complains of bilat ear wax. Has ear fullness. No pain. Review of Systems - ENT ROS: positive for - hearing change  Respiratory ROS: no cough, shortness of breath, or wheezing  Cardiovascular ROS: no chest pain or dyspnea on exertion  Gastrointestinal ROS: no abdominal pain, change in bowel habits, or black or bloody stools  Musculoskeletal ROS: negative  Neurological ROS: negative    Physical Examination:   Visit Vitals    /88 (BP 1 Location: Left arm, BP Patient Position: Sitting)    Pulse 71    Temp 98.3 °F (36.8 °C) (Oral)    Resp 18    Ht 6' (1.829 m)    Wt 184 lb (83.5 kg)    SpO2 97%    BMI 24.95 kg/m2       Constitutional: Well developed, nourished, no distress, alert   HENT: Exterior ears normal bilaterally. Supple neck. No thyromegaly or lymphadenopathy. Oropharynx clear and moist mucous membranes. +bilat impacted cerumen   Eyes: Conjunctiva normal. PERRL. CV: S1, S2.  RRR. No murmurs/rubs. No thrills palpated. No carotid bruits. Intact distal pulses. No edema. Pulm: No abnormalities on inspection. Clear to auscultation bilaterally. No wheezing/rhonchi. Normal effort.              Adelaide Queen MD

## 2017-11-01 NOTE — PATIENT INSTRUCTIONS
Earwax Blockage: Care Instructions  Your Care Instructions    Earwax is a natural substance that protects the ear canal. Normally, earwax drains from the ears and does not cause problems. Sometimes earwax builds up and hardens. Earwax blockage (also called cerumen impaction) can cause some loss of hearing and pain. When wax is tightly packed, you will need to have your doctor remove it. Follow-up care is a key part of your treatment and safety. Be sure to make and go to all appointments, and call your doctor if you are having problems. It's also a good idea to know your test results and keep a list of the medicines you take. How can you care for yourself at home? · Do not try to remove earwax with cotton swabs, fingers, or other objects. This can make the blockage worse and damage the eardrum. · If your doctor recommends that you try to remove earwax at home:  ¨ Soften and loosen the earwax with warm mineral oil. You also can try hydrogen peroxide mixed with an equal amount of room temperature water. Place 2 drops of the fluid, warmed to body temperature, in the ear two times a day for up to 5 days. ¨ Once the wax is loose and soft, all that is usually needed to remove it from the ear canal is a gentle, warm shower. Direct the water into the ear, then tip your head to let the earwax drain out. Dry your ear thoroughly with a hair dryer set on low. Hold the dryer several inches from your ear. ¨ If the warm mineral oil and shower do not work, use an over-the-counter wax softener followed by gentle flushing with an ear syringe each night for a week or two. Make sure the flushing solution is body temperature. Cool or hot fluids in the ear can cause dizziness. When should you call for help? Call your doctor now or seek immediate medical care if:  ? · Pus or blood drains from your ear. ? · Your ears are ringing or feel full. ? · You have a loss of hearing. ? Watch closely for changes in your health, and be sure to contact your doctor if:  ? · You have pain or reduced hearing after 1 week of home treatment. ? · You have any new symptoms, such as nausea or balance problems. Where can you learn more? Go to http://joe-kenzie.info/. Enter U169 in the search box to learn more about \"Earwax Blockage: Care Instructions. \"  Current as of: March 20, 2017  Content Version: 11.4  © 7708-2124 [employer]. Care instructions adapted under license by [employer] (which disclaims liability or warranty for this information). If you have questions about a medical condition or this instruction, always ask your healthcare professional. [place] [address] any warranty or liability for your use of this information.

## 2017-11-01 NOTE — PROGRESS NOTES
Mason Rodriguez is a 43 y.o. male (: 1975) presenting to address:    Chief Complaint   Patient presents with    Wax in Ear       Vitals:    17 1405   BP: 122/88   Pulse: 71   Resp: 18   Temp: 98.3 °F (36.8 °C)   TempSrc: Oral   SpO2: 97%   Weight: 184 lb (83.5 kg)   Height: 6' (1.829 m)   PainSc:   5   PainLoc: Knee     Depression Screening:     PHQ over the last two weeks 2017   Little interest or pleasure in doing things Not at all   Feeling down, depressed or hopeless Not at all   Total Score PHQ 2 0     Fall Risk Assessment:     Fall Risk Assessment, last 12 mths 2017   Able to walk? Yes   Fall in past 12 months? (No Data)     Abuse Screening:     Abuse Screening Questionnaire 2017   Do you ever feel afraid of your partner? N   Are you in a relationship with someone who physically or mentally threatens you? N   Is it safe for you to go home? Y     Coordination of Care Questionaire:   1. Have you been to the ER, urgent care clinic since your last visit? Hospitalized since your last visit? NO    2. Have you seen or consulted any other health care providers outside of the [address] [place] [address] since your last visit? Include any pap smears or colon screening. NO    Advanced Directive:   1. Do you have an Advanced Directive? NO    2. Would you like information on Advanced Directives?  NO

## 2017-12-05 ENCOUNTER — OFFICE VISIT (OUTPATIENT)
Dept: FAMILY MEDICINE CLINIC | Age: 42
End: 2017-12-05

## 2017-12-05 VITALS
HEIGHT: 72 IN | HEART RATE: 64 BPM | BODY MASS INDEX: 24.92 KG/M2 | DIASTOLIC BLOOD PRESSURE: 70 MMHG | RESPIRATION RATE: 20 BRPM | OXYGEN SATURATION: 98 % | WEIGHT: 184 LBS | TEMPERATURE: 97.7 F | SYSTOLIC BLOOD PRESSURE: 122 MMHG

## 2017-12-05 DIAGNOSIS — B35.6 TINEA CRURIS: Primary | ICD-10-CM

## 2017-12-05 NOTE — PROGRESS NOTES
Sandra Culp is a 43 y.o. male (: 1975) presenting to address:    Chief Complaint   Patient presents with    Exposure to STD       Vitals:    17 1141   BP: 122/70   Pulse: 64   Resp: 20   Temp: 97.7 °F (36.5 °C)   TempSrc: Oral   SpO2: 98%   Weight: 184 lb (83.5 kg)   Height: 6' (1.829 m)   PainSc:   0 - No pain       Hearing/Vision:   No exam data present    Learning Assessment:   No flowsheet data found. Depression Screening:     PHQ over the last two weeks 2017   Little interest or pleasure in doing things Not at all   Feeling down, depressed or hopeless Not at all   Total Score PHQ 2 0     Fall Risk Assessment:     Fall Risk Assessment, last 12 mths 2017   Able to walk? Yes   Fall in past 12 months? (No Data)     Abuse Screening:     Abuse Screening Questionnaire 2017   Do you ever feel afraid of your partner? N   Are you in a relationship with someone who physically or mentally threatens you? N   Is it safe for you to go home? Y     Coordination of Care Questionaire:   1. Have you been to the ER, urgent care clinic since your last visit? Hospitalized since your last visit? NO    2. Have you seen or consulted any other health care providers outside of the [address] [place] [address] since your last visit? Include any pap smears or colon screening. NO    Advanced Directive:   1. Do you have an Advanced Directive? YES    2. Would you like information on Advanced Directives?  NO

## 2017-12-05 NOTE — MR AVS SNAPSHOT
Visit Information Date & Time Provider Department Dept. Phone Encounter #  
 12/5/2017 11:45 AM Magda Santos, 3 [facility] [phone] [id number] Follow-up Instructions Return if symptoms worsen or fail to improve. Follow-up and Disposition History Upcoming Health Maintenance Date Due DTaP/Tdap/Td series (1 - Tdap) 6/14/1996 Allergies as of 12/5/2017  Review Complete On: 12/5/2017 By: Magda Santos MD  
 No Known Allergies Current Immunizations  Never Reviewed Name Date Influenza Vaccine 9/1/2017 Not reviewed this visit You Were Diagnosed With   
  
 Codes Comments Tinea cruris    -  Primary ICD-10-CM: B35.6 ICD-9-CM: 110.3 Vitals BP Pulse Temp Resp Height(growth percentile) Weight(growth percentile) 122/70 64 97.7 °F (36.5 °C) (Oral) 20 6' (1.829 m) 184 lb (83.5 kg) SpO2 BMI Smoking Status 98% 24.95 kg/m2 Never Smoker Vitals History BMI and BSA Data Body Mass Index Body Surface Area 24.95 kg/m 2 2.06 m 2 Preferred Pharmacy Pharmacy Name Phone [facility] [address] [facility] [address] [place] [address] Julius maldonado  [phone] Your Updated Medication List  
  
   
This list is accurate as of: 12/5/17 12:25 PM.  Always use your most recent med list.  
  
  
  
  
 baclofen 10 mg tablet Commonly known as:  LIORESAL Take  by mouth four (4) times daily. ibuprofen 800 mg tablet Commonly known as:  MOTRIN Take 1 Tab by mouth every eight (8) hours as needed for Pain. INNOPRAN  mg Cp24 Generic drug:  propranolol Take  by mouth. TOPAMAX 25 mg tablet Generic drug:  topiramate Take 12.5 mg by mouth once. trihexyphenidyl 2 mg tablet Commonly known as:  ARTANE Take  by mouth two (2) times a day. Follow-up Instructions Return if symptoms worsen or fail to improve. Patient Instructions Apply Lotrimin cream otc to the right groin rash twice daily for 10 days Introducing hospitals & HEALTH SERVICES! Eva Meneses introduces Smartisan patient portal. Now you can access parts of your medical record, email your doctor's office, and request medication refills online. 1. In your internet browser, go to [email or web address] 2. Click on the First Time User? Click Here link in the Sign In box. You will see the New Member Sign Up page. 3. Enter your Smartisan Access Code exactly as it appears below. You will not need to use this code after youve completed the sign-up process. If you do not sign up before the expiration date, you must request a new code. · Smartisan Access Code: [email or web address] Expires: 12/21/2017  9:59 AM 
 
4. Enter the last four digits of your Social Security Number (xxxx) and Date of Birth (mm/dd/yyyy) as indicated and click Submit. You will be taken to the next sign-up page. 5. Create a Smartisan ID. This will be your Smartisan login ID and cannot be changed, so think of one that is secure and easy to remember. 6. Create a Smartisan password. You can change your password at any time. 7. Enter your Password Reset Question and Answer. This can be used at a later time if you forget your password. 8. Enter your e-mail address. You will receive e-mail notification when new information is available in [address] 9. Click Sign Up. You can now view and download portions of your medical record. 10. Click the Download Summary menu link to download a portable copy of your medical information. If you have questions, please visit the Frequently Asked Questions section of the Smartisan website. Remember, Smartisan is NOT to be used for urgent needs. For medical emergencies, dial 911. Now available from your iPhone and Android! Please provide this summary of care documentation to your next provider. Your primary care clinician is listed as Kade Le. If you have any questions after today's visit, please call [phone].

## 2017-12-05 NOTE — PROGRESS NOTES
HISTORY OF PRESENT ILLNESS  Myles Dye is a 43 y.o. male. HPI  C/o red spot on the groin area for a week  ROS    Physical Exam   Genitourinary: Testes normal and penis normal. No penile erythema or penile tenderness. No discharge found. Skin: There is erythema (mild erythematous patch on the inner/upper thigh on the right side). Vitals reviewed. ASSESSMENT and PLAN  Diagnoses and all orders for this visit:    1.  Tinea cruris, Lotrimin cream otc, apply BID for 10 days  Follow up with pcp if not better in 2 weeks

## 2017-12-13 RX ORDER — FLUCONAZOLE 150 MG/1
150 TABLET ORAL
Qty: 4 TAB | Refills: 0 | Status: SHIPPED | OUTPATIENT
Start: 2017-12-13 | End: 2017-12-17

## 2017-12-19 ENCOUNTER — OFFICE VISIT (OUTPATIENT)
Dept: FAMILY MEDICINE CLINIC | Age: 42
End: 2017-12-19

## 2017-12-19 VITALS
HEART RATE: 64 BPM | TEMPERATURE: 98.4 F | BODY MASS INDEX: 24.38 KG/M2 | SYSTOLIC BLOOD PRESSURE: 110 MMHG | WEIGHT: 180 LBS | HEIGHT: 72 IN | DIASTOLIC BLOOD PRESSURE: 62 MMHG | RESPIRATION RATE: 19 BRPM | OXYGEN SATURATION: 98 %

## 2017-12-19 DIAGNOSIS — M25.512 CHRONIC LEFT SHOULDER PAIN: Primary | ICD-10-CM

## 2017-12-19 DIAGNOSIS — M48.00 CENTRAL STENOSIS OF SPINAL CANAL: ICD-10-CM

## 2017-12-19 DIAGNOSIS — M48.061 FORAMINAL STENOSIS OF LUMBAR REGION: ICD-10-CM

## 2017-12-19 DIAGNOSIS — G89.29 CHRONIC LEFT SHOULDER PAIN: Primary | ICD-10-CM

## 2017-12-19 DIAGNOSIS — S46.212A RUPTURE OF LEFT BICEPS TENDON, INITIAL ENCOUNTER: ICD-10-CM

## 2017-12-19 DIAGNOSIS — Z87.820 HISTORY OF TRAUMATIC BRAIN INJURY: ICD-10-CM

## 2017-12-19 PROBLEM — S06.9X9A TRAUMATIC BRAIN INJURY WITH LOSS OF CONSCIOUSNESS (HCC): Status: RESOLVED | Noted: 2017-03-14 | Resolved: 2017-12-19

## 2017-12-19 NOTE — PROGRESS NOTES
Assessment/Plan:    1. Chronic left shoulder pain in setting of chronic rupture L biceps tendon  -pt declined PT. Get xray and referral Dr. Danielito Gray [facility]  - XR SHOULDER LT AP/LAT MIN 2 V; Future    The plan was discussed with the patient. The patient verbalized understanding and is in agreement with the plan. All medication potential side effects were discussed with the patient. Health Maintenance:   Health Maintenance   Topic Date Due    DTaP/Tdap/Td series (1 - Tdap) 06/14/1996    Influenza Age 5 to Adult  Addressed       Olivia Berg is a 43 y.o. male and presents with Shoulder Pain     Subjective:  Pt c/o L shoulder pain, intermittent, x several weeks. Pain doesn't radiate. Has chronic biceps head rupture. He has pain with lifting things, or reaching posteriorly. No inciting event. Has seen ortho previously, and given steroid injections with relief. He is not interested in PT. He doesn't have any pain today. ROS:  Constitutional: No recent weight change. No weakness/fatigue. No f/c. Cardiovascular: No CP/palpitations. No JOHNSON/orthopnea/PND. Respiratory: No cough/sputum, dyspnea, wheezing. Gastointestinal: No dysphagia, reflux. No n/v. No constipation/diarrhea. No melena/rectal bleeding. Genitourinary: No dysuria, urinary hesitancy, nocturia, hematuria. No incontinence. Musculoskeletal: + joint pain/stiffness. No muscle pain/tenderness. Neurological: No seizures/numbness/weakness. No paresthesias. The problem list was updated as a part of today's visit. Patient Active Problem List   Diagnosis Code    Traumatic brain injury with loss of consciousness ([facility] [address] S06. 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     SH:  Social History   Substance Use Topics    Smoking status: Never Smoker    Smokeless tobacco: Never Used   [facility] Alcohol use Yes      Comment: socially       Medications/Allergies:  Current Outpatient Prescriptions on File Prior to Visit   Medication Sig Dispense Refill    ibuprofen (MOTRIN) 800 mg tablet Take 1 Tab by mouth every eight (8) hours as needed for Pain. 60 Tab 0    trihexyphenidyl (ARTANE) 2 mg tablet Take  by mouth two (2) times a day.  propranolol (INNOPRAN XL) 120 mg cp24 Take  by mouth.  baclofen (LIORESAL) 10 mg tablet Take  by mouth four (4) times daily.  topiramate (TOPAMAX) 25 mg tablet Take 12.5 mg by mouth once. No current facility-administered medications on file prior to visit. No Known Allergies    Objective:  Visit Vitals    /62 (BP 1 Location: Left arm, BP Patient Position: Sitting)    Pulse 64    Temp 98.4 °F (36.9 °C) (Oral)    Resp 19    Ht 6' (1.829 m)    Wt 180 lb (81.6 kg)    SpO2 98%    BMI 24.41 kg/m2      Constitutional: Well developed, nourished, no distress, alert   CV: S1, S2.  RRR. No murmurs/rubs. No thrills palpated. No carotid bruits. Intact distal pulses. No edema. Pulm: No abnormalities on inspection. Clear to auscultation bilaterally. No wheezing/rhonchi. Normal effort. MS: Gait abnormal.  R hand contracted. L biceps chronically contracted from rupture. No pain with ROM of L shoulder. Pain pain with supination. Neuro:  Speech slow.      Labwork and Ancillary Studies:    CBC w/Diff  Lab Results   Component Value Date/Time    WBC 6.1 04/03/2017 07:24 AM    HGB 15.5 04/03/2017 07:24 AM    PLATELET 908 92/06/5782 07:24 AM         Basic Metabolic Profile/LFTs  Lab Results   Component Value Date/Time    Sodium 142 04/03/2017 07:24 AM    Potassium 4.3 04/03/2017 07:24 AM    Chloride 107 04/03/2017 07:24 AM    CO2 25 04/03/2017 07:24 AM    Anion gap 10 04/03/2017 07:24 AM    Glucose 92 04/03/2017 07:24 AM    BUN 19 04/03/2017 07:24 AM    Creatinine 0.84 04/03/2017 07:24 AM    BUN/Creatinine ratio 23 04/03/2017 07:24 AM    GFR est AA >60 04/03/2017 07:24 AM    GFR est non-AA >60 04/03/2017 07:24 AM    Calcium 8.3 04/03/2017 07:24 AM      Lab Results   Component Value Date/Time    ALT (SGPT) 26 04/03/2017 07:24 AM    AST (SGOT) 16 04/03/2017 07:24 AM    Alk.  phosphatase 65 04/03/2017 07:24 AM    Bilirubin, total 0.5 04/03/2017 07:24 AM       Cholesterol  Lab Results   Component Value Date/Time    Cholesterol, total 179 04/03/2017 07:24 AM    HDL Cholesterol 49 04/03/2017 07:24 AM    LDL, calculated 113 04/03/2017 07:24 AM    Triglyceride 85 04/03/2017 07:24 AM    CHOL/HDL Ratio 3.7 04/03/2017 07:24 AM

## 2017-12-19 NOTE — MR AVS SNAPSHOT
Visit Information Date & Time Provider Department Dept. Phone Encounter #  
 12/19/2017  9:00 AM Mallika Hye, [facility] [phone] [id number] Upcoming Health Maintenance Date Due DTaP/Tdap/Td series (1 - Tdap) 6/14/1996 Allergies as of 12/19/2017  Review Complete On: 12/19/2017 By: Migdalia Mariano LPN No Known Allergies Current Immunizations  Never Reviewed Name Date Influenza Vaccine 9/1/2017 Not reviewed this visit You Were Diagnosed With   
  
 Codes Comments Chronic left shoulder pain    -  Primary ICD-10-CM: M25.512, R14.66 ICD-9-CM: 719.41, 338.29 Rupture of left biceps tendon, initial encounter     ICD-10-CM: Q47.156T ICD-9-CM: 840.8 Vitals BP Pulse Temp Resp Height(growth percentile) Weight(growth percentile) 110/62 (BP 1 Location: Left arm, BP Patient Position: Sitting) 64 98.4 °F (36.9 °C) (Oral) 19 6' (1.829 m) 180 lb (81.6 kg) SpO2 BMI Smoking Status 98% 24.41 kg/m2 Never Smoker Vitals History BMI and BSA Data Body Mass Index Body Surface Area  
 24.41 kg/m 2 2.04 m 2 Preferred Pharmacy Pharmacy Name Phone [facility] [address] [place] [address] [phone] Your Updated Medication List  
  
   
This list is accurate as of: 12/19/17  9:40 AM.  Always use your most recent med list.  
  
  
  
  
 baclofen 10 mg tablet Commonly known as:  LIORESAL Take  by mouth four (4) times daily. ibuprofen 800 mg tablet Commonly known as:  MOTRIN Take 1 Tab by mouth every eight (8) hours as needed for Pain. INNOPRAN  mg Cp24 Generic drug:  propranolol Take  by mouth. TOPAMAX 25 mg tablet Generic drug:  topiramate Take 12.5 mg by mouth once. trihexyphenidyl 2 mg tablet Commonly known as:  ARTANE Take  by mouth two (2) times a day. We Performed the Following REFERRAL TO SPORTS MEDICINE [YCS778 Custom] To-Do List   
 12/19/2017 Imaging:  XR SHOULDER LT AP/LAT MIN 2 V Referral Information Referral ID Referred By Referred To  
  
 [id number] [address] Neli Clark, [address] [facility], [address] [place] [address] [address] [place] [address] Phone: [phone] Fax: [phone] Visits Status Start Date End Date 1 New Request 12/19/17 12/19/18 If your referral has a status of pending review or denied, additional information will be sent to support the outcome of this decision. Introducing [facility] & HEALTH SERVICES! Merle Kwon introduces ImmuneWorks patient portal. Now you can access parts of your medical record, email your doctor's office, and request medication refills online. 1. In your internet browser, go to [email or web address] 2. Click on the First Time User? Click Here link in the Sign In box. You will see the New Member Sign Up page. 3. Enter your ImmuneWorks Access Code exactly as it appears below. You will not need to use this code after youve completed the sign-up process. If you do not sign up before the expiration date, you must request a new code. · ImmuneWorks Access Code: [email or web address] Expires: 12/21/2017  9:59 AM 
 
4. Enter the last four digits of your Social Security Number (xxxx) and Date of Birth (mm/dd/yyyy) as indicated and click Submit. You will be taken to the next sign-up page. 5. Create a [employer] ID. This will be your ImmuneWorks login ID and cannot be changed, so think of one that is secure and easy to remember. 6. Create a ImmuneWorks password. You can change your password at any time. 7. Enter your Password Reset Question and Answer. This can be used at a later time if you forget your password. 8. Enter your e-mail address. You will receive e-mail notification when new information is available in [address] 9. Click Sign Up. You can now view and download portions of your medical record. 10. Click the Download Summary menu link to download a portable copy of your medical information. If you have questions, please visit the Frequently Asked Questions section of the Say-Hey website. Remember, Say-Hey is NOT to be used for urgent needs. For medical emergencies, dial 911. Now available from your iPhone and Android! Please provide this summary of care documentation to your next provider. Your primary care clinician is listed as Kade 13. If you have any questions after today's visit, please call [phone].

## 2017-12-19 NOTE — PROGRESS NOTES
Pt is interested in cortisone injections not physical therapy. Maritza Becerra is a 43 y.o. male (: 1975) presenting to address:    Chief Complaint   Patient presents with    Shoulder Pain       Vitals:    17 0919   BP: 110/62   Pulse: 64   Resp: 19   Temp: 98.4 °F (36.9 °C)   TempSrc: Oral   SpO2: 98%   Weight: 180 lb (81.6 kg)   Height: 6' (1.829 m)   PainSc:   0 - No pain       Hearing/Vision:   No exam data present    Learning Assessment:   No flowsheet data found. Depression Screening:     PHQ over the last two weeks 2017   Little interest or pleasure in doing things Not at all   Feeling down, depressed or hopeless Not at all   Total Score PHQ 2 0     Fall Risk Assessment:     Fall Risk Assessment, last 12 mths 2017   Able to walk? Yes   Fall in past 12 months? (No Data)     Abuse Screening:     Abuse Screening Questionnaire 2017   Do you ever feel afraid of your partner? N   Are you in a relationship with someone who physically or mentally threatens you? N   Is it safe for you to go home? Y     Coordination of Care Questionaire:   1. Have you been to the ER, urgent care clinic since your last visit? Hospitalized since your last visit? No     2. Have you seen or consulted any other health care providers outside of the [address] [place] [address] since your last visit? Include any pap smears or colon screening. No     Advanced Directive:   1. Do you have an Advanced Directive? No   2. Would you like information on Advanced Directives?   No   Health Maintenance Due   Topic Date Due    DTaP/Tdap/Td series (1 - Tdap) 1996

## 2018-01-18 ENCOUNTER — OFFICE VISIT (OUTPATIENT)
Dept: FAMILY MEDICINE CLINIC | Age: 43
End: 2018-01-18

## 2018-01-18 VITALS
BODY MASS INDEX: 24.65 KG/M2 | TEMPERATURE: 96.6 F | HEIGHT: 72 IN | WEIGHT: 182 LBS | OXYGEN SATURATION: 97 % | HEART RATE: 75 BPM | DIASTOLIC BLOOD PRESSURE: 80 MMHG | SYSTOLIC BLOOD PRESSURE: 121 MMHG | RESPIRATION RATE: 20 BRPM

## 2018-01-18 DIAGNOSIS — B35.4 TINEA CORPORIS: ICD-10-CM

## 2018-01-18 DIAGNOSIS — H91.92 HEARING LOSS OF LEFT EAR, UNSPECIFIED HEARING LOSS TYPE: Primary | ICD-10-CM

## 2018-01-18 DIAGNOSIS — H61.23 BILATERAL IMPACTED CERUMEN: ICD-10-CM

## 2018-01-18 NOTE — PROGRESS NOTES
Dory Rachel is a 43 y.o. male (: 1975) presenting to address:    Chief Complaint   Patient presents with    Ear Pain       Vitals:    18 1053   BP: 121/80   Pulse: 75   Resp: 20   Temp: 96.6 °F (35.9 °C)   TempSrc: Oral   SpO2: 97%   Weight: 182 lb (82.6 kg)   Height: 6' (1.829 m)   PainSc:   0 - No pain       Hearing/Vision:   No exam data present    Learning Assessment:   No flowsheet data found. Depression Screening:     PHQ over the last two weeks 2018   Little interest or pleasure in doing things Not at all   Feeling down, depressed or hopeless Not at all   Total Score PHQ 2 0     Fall Risk Assessment:     Fall Risk Assessment, last 12 mths 2017   Able to walk? Yes   Fall in past 12 months? (No Data)     Abuse Screening:     Abuse Screening Questionnaire 2017   Do you ever feel afraid of your partner? N   Are you in a relationship with someone who physically or mentally threatens you? N   Is it safe for you to go home? Y     Coordination of Care Questionaire:   1. Have you been to the ER, urgent care clinic since your last visit? Hospitalized since your last visit? NO    2. Have you seen or consulted any other health care providers outside of the [address] [place] [address] since your last visit? Include any pap smears or colon screening. YES, Dr Marian Avila (Physical Rehabilitation) [place]    Advanced Directive:   1. Do you have an Advanced Directive? NO    2. Would you like information on Advanced Directives?  NO

## 2018-02-22 ENCOUNTER — TELEPHONE (OUTPATIENT)
Dept: FAMILY MEDICINE CLINIC | Age: 43
End: 2018-02-22

## 2018-02-22 NOTE — TELEPHONE ENCOUNTER
Patient is calling stating that \"the fugus has returned and is looking for a solution\"    Please assist

## 2018-02-23 ENCOUNTER — TELEPHONE (OUTPATIENT)
Dept: FAMILY MEDICINE CLINIC | Age: 43
End: 2018-02-23

## 2018-02-23 NOTE — TELEPHONE ENCOUNTER
Pt called f/u with the same issues addressed in the 2 messages from yesterday and today. He wanted to speak to Puja Rendon but she was unavailable at the time of his call. He said that Puja Rendon mentioned a referral to Kimani Freire. He says he would like to be referred to the dermatologist upstairs from us at [facility]. He also was f/u up for the request on lamisil for the fungus on his finger.  Please advise

## 2018-02-23 NOTE — TELEPHONE ENCOUNTER
Pt called back about the fungus. He wasn't looking for a derm referral. He has difficulty with speech and perhaps the message was not clear to the . He saw his derm in [place] about this before. , most recently 2017. They just recommend the lamisil tabs. Pt would just like a refill sent to his new pharmacy [facility]

## 2018-05-31 ENCOUNTER — TELEPHONE (OUTPATIENT)
Dept: FAMILY MEDICINE CLINIC | Age: 43
End: 2018-05-31

## 2018-05-31 NOTE — TELEPHONE ENCOUNTER
Pt would like a few names for local physiatrists. He uses his doctor in [place] and has an appt in July. He is okay on his medication refills for now but would like to check out some local doctors. Please advise.

## 2018-06-01 NOTE — TELEPHONE ENCOUNTER
Refer to [facility] or [facility]. Verify that he isn't looking for PT.   If he wants PT, I can put in that referral for [facility]

## 2018-06-04 ENCOUNTER — TELEPHONE (OUTPATIENT)
Dept: FAMILY MEDICINE CLINIC | Age: 43
End: 2018-06-04

## 2018-06-04 NOTE — TELEPHONE ENCOUNTER
Pt is asking to have referral to physiatry in the area. Recommending Dr. Cleveland Murillo from [facility] [address] and Dr Leodis Bernheim  [phone]. Called pt with this info, he said he will call back.

## 2018-06-08 ENCOUNTER — TELEPHONE (OUTPATIENT)
Dept: FAMILY MEDICINE CLINIC | Age: 43
End: 2018-06-08

## 2018-06-08 NOTE — TELEPHONE ENCOUNTER
Patient is calling requesting to have the last 3 office vists, Demographic, and labs sent over the Dr. Khoi Delgado at [facility].     Please assist

## 2018-06-14 NOTE — TELEPHONE ENCOUNTER
Called pt, called [employer]- he can use Dr. Yamil Katz or Dr Aimee Del Valle. He asked that I mail him these contacts.

## 2018-06-28 NOTE — TELEPHONE ENCOUNTER
Spoke to pt. He cannot see Dr. Lillian Vega for his condition. Was told he should see Dr. Tera Dewey (neuro). He is a current pt there , next appt 8/15/18.

## 2018-10-11 ENCOUNTER — TELEPHONE (OUTPATIENT)
Dept: FAMILY MEDICINE CLINIC | Age: 43
End: 2018-10-11

## 2018-12-18 ENCOUNTER — TELEPHONE (OUTPATIENT)
Dept: FAMILY MEDICINE CLINIC | Age: 43
End: 2018-12-18

## 2018-12-18 NOTE — TELEPHONE ENCOUNTER
Pt called wanting to speak w/ the nurse because he had gotten a cholera shot and that he was having a reaction to it. After speaking to the nurse she recommended that he contact wherever he got the shots from (which was from [employer]) . I informed the pt of this and he was not really understanding why he had to contact them. He is requesting a call back from the nurse.

## 2018-12-18 NOTE — TELEPHONE ENCOUNTER
Pt called back again stating he was nauseous, dizzy and had not taken his medicine. Transferred to the nurse. Pt states he is on his way to the ED, breathing heavily on the phone. Pt said someone was driving him. He states he had the shot last week Tuesday 12/11/18 at [employer]. He could not hold a conversation. He just kept breathing heavy into the phone followed by extremely long pauses. I told the patient he was doing the right thing by going to the ED and to call us back with more detail if he felt better after his ED visit.

## 2018-12-19 ENCOUNTER — OFFICE VISIT (OUTPATIENT)
Dept: FAMILY MEDICINE CLINIC | Age: 43
End: 2018-12-19

## 2018-12-19 VITALS
HEIGHT: 72 IN | HEART RATE: 72 BPM | WEIGHT: 184 LBS | OXYGEN SATURATION: 96 % | SYSTOLIC BLOOD PRESSURE: 120 MMHG | TEMPERATURE: 98.2 F | DIASTOLIC BLOOD PRESSURE: 80 MMHG | BODY MASS INDEX: 24.92 KG/M2 | RESPIRATION RATE: 20 BRPM

## 2018-12-19 DIAGNOSIS — Z00.00 MEDICARE ANNUAL WELLNESS VISIT, SUBSEQUENT: ICD-10-CM

## 2018-12-19 DIAGNOSIS — K52.9 GASTROENTERITIS: Primary | ICD-10-CM

## 2018-12-19 DIAGNOSIS — Z71.84 COUNSELING ABOUT TRAVEL: ICD-10-CM

## 2018-12-19 RX ORDER — AZITHROMYCIN 500 MG/1
1000 TABLET, FILM COATED ORAL ONCE
Qty: 2 TAB | Refills: 0 | Status: SHIPPED | OUTPATIENT
Start: 2018-12-19 | End: 2018-12-19

## 2018-12-19 RX ORDER — NEFAZODONE HYDROCHLORIDE 200 MG/1
TABLET ORAL 2 TIMES DAILY
COMMUNITY

## 2018-12-19 RX ORDER — IBUPROFEN 200 MG
TABLET ORAL
COMMUNITY
End: 2019-06-25 | Stop reason: ALTCHOICE

## 2018-12-19 RX ORDER — SCOLOPAMINE TRANSDERMAL SYSTEM 1 MG/1
1 PATCH, EXTENDED RELEASE TRANSDERMAL
Qty: 5 PATCH | Refills: 0 | Status: SHIPPED | OUTPATIENT
Start: 2018-12-19 | End: 2019-06-25 | Stop reason: ALTCHOICE

## 2018-12-19 NOTE — PROGRESS NOTES
Germain Delgadillo is a 37 y.o. male (: 1975) presenting to address:    Chief Complaint   Patient presents with    New Order     Motion sickness med for travel       Vitals:    18 1341   BP: 120/80   Pulse: 72   Resp: 20   Temp: 98.2 °F (36.8 °C)   TempSrc: Oral   SpO2: 96%   Weight: 184 lb (83.5 kg)   Height: 6' (1.829 m)   PainSc:   0 - No pain       Learning Assessment:     Learning Assessment 2018   PRIMARY LEARNER Patient   BARRIERS PRIMARY LEARNER NONE   CO-LEARNER CAREGIVER No   PRIMARY LANGUAGE ENGLISH    NEED No   LEARNER PREFERENCE PRIMARY READING   LEARNING SPECIAL TOPICS none   ANSWERED BY patient   RELATIONSHIP SELF   ASSESSMENT COMMENT n/a     Depression Screening:     PHQ over the last two weeks 2018   Little interest or pleasure in doing things Not at all   Feeling down, depressed, irritable, or hopeless Not at all   Total Score PHQ 2 0     Fall Risk Assessment:     Fall Risk Assessment, last 12 mths 2018   Able to walk? Yes   Fall in past 12 months? No     Abuse Screening:     Abuse Screening Questionnaire 2018   Do you ever feel afraid of your partner? N   Are you in a relationship with someone who physically or mentally threatens you? N   Is it safe for you to go home? Y     Coordination of Care Questionaire:   1. Have you been to the ER, urgent care clinic since your last visit? Hospitalized since your last visit? YES [facility]-ED 18    2. Have you seen or consulted any other health care providers outside of the [address] [place] [address] since your last visit? Include any pap smears or colon screening. YES neurology 8/15/18, ENT 18    Advanced Directive:   1. Do you have an Advanced Directive? YES    2. Would you like information on Advanced Directives?  NO

## 2018-12-19 NOTE — PATIENT INSTRUCTIONS
Medicare Wellness Visit, Male    The best way to live healthy is to have a lifestyle where you eat a well-balanced diet, exercise regularly, limit alcohol use, and quit all forms of tobacco/nicotine, if applicable. Regular preventive services are another way to keep healthy. Preventive services (vaccines, screening tests, monitoring & exams) can help personalize your care plan, which helps you manage your own care. Screening tests can find health problems at the earliest stages, when they are easiest to treat. [facility] follows the current, evidence-based guidelines published by the [facility] Shane Sammie ([facility]) when recommending preventive services for our patients. Because we follow these guidelines, sometimes recommendations change over time as research supports it. (For example, a prostate screening blood test is no longer routinely recommended for men with no symptoms.)  Of course, you and your doctor may decide to screen more often for some diseases, based on your risk and co-morbidities (chronic disease you are already diagnosed with). Preventive services for you include:  - Medicare offers their members a free annual wellness visit, which is time for you and your primary care provider to discuss and plan for your preventive service needs. Take advantage of this benefit every year!  -All adults over age 72 should receive the recommended pneumonia vaccines. Current USPSTF guidelines recommend a series of two vaccines for the best pneumonia protection.   -All adults should have a flu vaccine yearly and an ECG.  All adults age 61 and older should receive a shingles vaccine once in their lifetime.    -All adults age 38-68 who are overweight should have a diabetes screening test once every three years.   -Other screening tests & preventive services for persons with diabetes include: an eye exam to screen for diabetic retinopathy, a kidney function test, a foot exam, and stricter control over your cholesterol.   -Cardiovascular screening for adults with routine risk involves an electrocardiogram (ECG) at intervals determined by the provider.   -Colorectal cancer screening should be done for adults age 54-65 with no increased risk factors for colorectal cancer. There are a number of acceptable methods of screening for this type of cancer. Each test has its own benefits and drawbacks. Discuss with your provider what is most appropriate for you during your annual wellness visit. The different tests include: colonoscopy (considered the best screening method), a fecal occult blood test, a fecal DNA test, and sigmoidoscopy.  -All adults born between [facility] should be screened once for Hepatitis C.  -An Abdominal Aortic Aneurysm (AAA) Screening is recommended for men age 73-68 who has ever smoked in their lifetime.      Here is a list of your current Health Maintenance items (your personalized list of preventive services) with a due date:  Health Maintenance Due   Topic Date Due    Annual Well Visit  10/13/2018

## 2018-12-19 NOTE — ACP (ADVANCE CARE PLANNING)
Advance Care Planning (ACP) Provider Conversation Snapshot    Date of ACP Conversation: 12/19/18  Persons included in Conversation:  patient  Length of ACP Conversation in minutes:  <16 minutes (Non-Billable)    Authorized Decision Maker (if patient is incapable of making informed decisions):    This person is:   Healthcare Agent/Medical Power of  under Advance Directive-parents          For Patients with Decision Making Capacity:   full code    Conversation Outcomes / Follow-Up Plan:   Recommended completion of Advance Directive form after review of ACP materials and conversation with prospective healthcare agent

## 2018-12-19 NOTE — PROGRESS NOTES
Assessment/Plan:    1. Gastroenteritis  -resolved    2. Counseling about travel  -appropriate vaccines received    3. Medicare annual wellness visit, subsequent      The plan was discussed with the patient. The patient verbalized understanding and is in agreement with the plan. All medication potential side effects were discussed with the patient. Health Maintenance:   Health Maintenance   Topic Date Due    MEDICARE [facility]  12/20/2019    DTaP/Tdap/Td series (2 - Td) 12/11/2028    Influenza Age 5 to Adult  Completed       Mariel Kim is a 37 y.o. male and presents with New Order (Motion sickness med for travel)     Subjective:  Pt recently seen in ER 12/18 and found to be hypotensive and felt to be dehydrated. bp responded after 1L fluids. This started after getting 3 vaccines (tdap, typhoid, hep A) on 12/11. He didn't have any sx until 5 days later, when he had n/v.  He is travelling to [facility] for one year. He's requesting motion sickness meds (for flight) and travellers diarrhea meds. ROS:  Constitutional: No recent weight change. No weakness/fatigue. No f/c. Cardiovascular: No CP/palpitations. No JOHNSON/orthopnea/PND. Respiratory: No cough/sputum, dyspnea, wheezing. Gastointestinal: No dysphagia, reflux. No n/v. No constipation/diarrhea. No melena/rectal bleeding. The problem list was updated as a part of today's visit. 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     SH:  Social History     Tobacco Use    Smoking status: Never Smoker    Smokeless tobacco: Never Used   Substance Use Topics    Alcohol use: Yes     Comment: socially    Drug use: No       Medications/Allergies:  Current Outpatient Medications on File Prior to Visit   Medication Sig Dispense Refill    ibuprofen (ADVIL) 200 mg tablet Take  by mouth.  nefazodone (SERZONE) 200 mg tablet Take  by mouth two (2) times a day.  trihexyphenidyl (ARTANE) 2 mg tablet Take  by mouth two (2) times a day.  baclofen (LIORESAL) 10 mg tablet Take  by mouth four (4) times daily.  topiramate (TOPAMAX) 25 mg tablet Take 12.5 mg by mouth once.  ibuprofen (MOTRIN) 800 mg tablet Take 1 Tab by mouth every eight (8) hours as needed for Pain. 60 Tab 0    propranolol (INNOPRAN XL) 120 mg cp24 Take  by mouth. Current Facility-Administered Medications on File Prior to Visit   Medication Dose Route Frequency Provider Last Rate Last Dose    [DISCONTINUED] sodium chloride (NS) flush  10 mL   Provider, Generic External Data            No Known Allergies    Objective:  Visit Vitals  /80 (BP 1 Location: Left arm, BP Patient Position: Sitting)   Pulse 72   Temp 98.2 °F (36.8 °C) (Oral)   Resp 20   Ht 6' (1.829 m)   Wt 184 lb (83.5 kg)   SpO2 96%   BMI 24.95 kg/m²      Constitutional: Well developed, nourished, no distress, alert   Eyes: Conjunctiva normal. PERRL. Eyelids normal.   CV: S1, S2.  RRR. No murmurs/rubs. No thrills palpated. No carotid bruits. Intact distal pulses. No edema. No aortic bruits. Pulm: No abnormalities on inspection. Clear to auscultation bilaterally. No wheezing/rhonchi. Normal effort. GI: Soft, nontender, nondistended. Normal active bowel sounds. This is the Subsequent Medicare Annual Wellness Exam, performed 12 months or more after the Initial AWV or the last Subsequent AWV    I have reviewed the patient's medical history in detail and updated the computerized patient record.      History     Past Medical History:   Diagnosis Date    TBI [facility])       Past Surgical History:   Procedure Laterality Date    HX TRACHEOSTOMY  1987     Current Outpatient Medications   Medication Sig Dispense Refill    ibuprofen (ADVIL) 200 mg tablet Take  by mouth.      nefazodone (SERZONE) 200 mg tablet Take  by mouth two (2) times a day.  scopolamine (TRANSDERM-SCOP) 1 mg over 3 days pt3d 1 Patch by TransDERmal route every seventy-two (72) hours. 5 Patch 0    azithromycin (ZITHROMAX) 500 mg tab Take 2 Tabs by mouth once for 1 dose. For diarrhea 2 Tab 0    trihexyphenidyl (ARTANE) 2 mg tablet Take  by mouth two (2) times a day.  baclofen (LIORESAL) 10 mg tablet Take  by mouth four (4) times daily.  topiramate (TOPAMAX) 25 mg tablet Take 12.5 mg by mouth once.  ibuprofen (MOTRIN) 800 mg tablet Take 1 Tab by mouth every eight (8) hours as needed for Pain. 60 Tab 0    propranolol (INNOPRAN XL) 120 mg cp24 Take  by mouth. 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 Functional Ability and Level of Safety:   Hearing Loss  Hearing is good. Activities of Daily Living  The home contains: handrails  Patient does total self care    Fall Risk  Fall Risk Assessment, last 12 mths 12/19/2018   Able to walk? Yes   Fall in past 12 months?  No       Abuse Screen  Patient is not abused    Cognitive Screening   Evaluation of Cognitive Function:  Has your family/caregiver stated any concerns about your memory: no  Normal    Patient Care Team   Patient Care Team:  Julissa Martin MD as PCP - General (Internal Medicine)    Assessment/Plan   Education and counseling provided:  Are appropriate based on today's review and evaluation  End-of-Life planning (with patient's consent)    Diagnoses and all orders for this visit:    1. Gastroenteritis    2. Counseling about travel    3. Medicare annual wellness visit, subsequent    Other orders  -     scopolamine (TRANSDERM-SCOP) 1 mg over 3 days pt3d; 1 Patch by TransDERmal route every seventy-two (72) hours. -     azithromycin (ZITHROMAX) 500 mg tab; Take 2 Tabs by mouth once for 1 dose. For diarrhea        There are no preventive care reminders to display for this patient.

## 2019-03-12 ENCOUNTER — OFFICE VISIT (OUTPATIENT)
Dept: FAMILY MEDICINE CLINIC | Age: 44
End: 2019-03-12

## 2019-03-12 VITALS
TEMPERATURE: 98.4 F | BODY MASS INDEX: 24.24 KG/M2 | RESPIRATION RATE: 20 BRPM | OXYGEN SATURATION: 97 % | SYSTOLIC BLOOD PRESSURE: 130 MMHG | DIASTOLIC BLOOD PRESSURE: 78 MMHG | WEIGHT: 179 LBS | HEIGHT: 72 IN | HEART RATE: 73 BPM

## 2019-03-12 DIAGNOSIS — R05.8 POST-VIRAL COUGH SYNDROME: Primary | ICD-10-CM

## 2019-03-12 DIAGNOSIS — Z01.84 IMMUNITY STATUS TESTING: ICD-10-CM

## 2019-03-12 RX ORDER — BENZONATATE 200 MG/1
200 CAPSULE ORAL
Qty: 60 CAP | Refills: 0 | Status: SHIPPED | OUTPATIENT
Start: 2019-03-12 | End: 2019-06-25 | Stop reason: ALTCHOICE

## 2019-03-12 NOTE — PROGRESS NOTES
Mayito Durand is a 37 y.o. male (: 1975) presenting to address:    Chief Complaint   Patient presents with    ED Follow-up       Vitals:    19 0955   BP: 130/78   Pulse: 73   Resp: 20   Temp: 98.4 °F (36.9 °C)   TempSrc: Oral   SpO2: 97%   Weight: 179 lb (81.2 kg)   Height: 6' (1.829 m)   PainSc:   0 - No pain       Learning Assessment:     Learning Assessment 2018   PRIMARY LEARNER Patient   BARRIERS PRIMARY LEARNER NONE   CO-LEARNER CAREGIVER No   PRIMARY LANGUAGE ENGLISH    NEED No   LEARNER PREFERENCE PRIMARY READING   LEARNING SPECIAL TOPICS none   ANSWERED BY patient   RELATIONSHIP SELF   ASSESSMENT COMMENT n/a     Depression Screening:     3 most recent PHQ Screens 2018   Little interest or pleasure in doing things Not at all   Feeling down, depressed, irritable, or hopeless Not at all   Total Score PHQ 2 0     Fall Risk Assessment:     Fall Risk Assessment, last 12 mths 2018   Able to walk? Yes   Fall in past 12 months? No     Abuse Screening:     Abuse Screening Questionnaire 2018   Do you ever feel afraid of your partner? N   Are you in a relationship with someone who physically or mentally threatens you? N   Is it safe for you to go home? Y     Coordination of Care Questionaire:   1. Have you been to the ER, urgent care clinic since your last visit? Hospitalized since your last visit? YES [facility]-ED     2. Have you seen or consulted any other health care providers outside of the [facility] since your last visit? Include any pap smears or colon screening. YES ENT    Advanced Directive:   1. Do you have an Advanced Directive? YES    2. Would you like information on Advanced Directives?  NO

## 2019-03-12 NOTE — PROGRESS NOTES
Assessment/Plan:    1. Post-viral cough syndrome  -clinically sounds like flu. Resolving. No indication for antibiotics. The plan was discussed with the patient. The patient verbalized understanding and is in agreement with the plan. All medication potential side effects were discussed with the patient. Health Maintenance:   Health Maintenance   Topic Date Due    MEDICARE [place]  12/20/2019    DTaP/Tdap/Td series (2 - Td) 12/11/2028    Influenza Age 5 to Adult  Completed       Burke Fontanez is a 37 y.o. male and presents with ED Follow-up     Subjective:  Was seen in ER 2/27 for cough, now improving. Only with minimal cough currently. States he got ill while travelling to [facility]- states he had nausea/vomiting, cough, fever/chills and HA on 2/24. Has h/o flu vaccination this season. I reviewed labs, which were significant for leukocytosis and positive d-dimer. CXR was negative. He underwent CTA chest, which was negative for PE. Ultimately diagnosed with viral cough and sent home with tessalon. His fiancee apparently is concerned this is caused by cancer (although CT chest/abd/pelvis was negative). He is requesting MMR Ab testing given recent outbreaks,    ROS:  Constitutional: No recent weight change. No weakness/fatigue. No f/c. Cardiovascular: No CP/palpitations. No JOHNSON/orthopnea/PND. Respiratory: + cough, no dyspnea,or  wheezing. Gastointestinal: No dysphagia, reflux. No n/v. No constipation/diarrhea. No melena/rectal bleeding. The problem list was updated as a part of today's visit. 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     SH:  Social History     Tobacco Use    Smoking status: Never Smoker    Smokeless tobacco: Never Used   Substance Use Topics    Alcohol use: Yes     Comment: socially    Drug use: No       Medications/Allergies:  Current Outpatient Medications on File Prior to Visit   Medication Sig Dispense Refill    ibuprofen (ADVIL) 200 mg tablet Take  by mouth.  nefazodone (SERZONE) 200 mg tablet Take  by mouth two (2) times a day.  scopolamine (TRANSDERM-SCOP) 1 mg over 3 days pt3d 1 Patch by TransDERmal route every seventy-two (72) hours. 5 Patch 0    ibuprofen (MOTRIN) 800 mg tablet Take 1 Tab by mouth every eight (8) hours as needed for Pain. 60 Tab 0    trihexyphenidyl (ARTANE) 2 mg tablet Take  by mouth two (2) times a day.  propranolol (INNOPRAN XL) 120 mg cp24 Take  by mouth.  baclofen (LIORESAL) 10 mg tablet Take  by mouth four (4) times daily.  topiramate (TOPAMAX) 25 mg tablet Take 12.5 mg by mouth once. No current facility-administered medications on file prior to visit. No Known Allergies    Objective:  Visit Vitals  /78 (BP 1 Location: Left arm, BP Patient Position: Sitting)   Pulse 73   Temp 98.4 °F (36.9 °C) (Oral)   Resp 20   Ht 6' (1.829 m)   Wt 179 lb (81.2 kg)   SpO2 97%   BMI 24.28 kg/m²      Constitutional: Well developed, nourished, no distress, alert   HENT: Exterior ears and tympanic membranes normal bilaterally. Supple neck. No thyromegaly or lymphadenopathy. Oropharynx clear and moist mucous membranes. Normal inferior turbinates. Septum midline. Eyes: Conjunctiva normal. PERRL. Eyelids normal.   CV: S1, S2.  RRR. No murmurs/rubs. No thrills palpated. No carotid bruits. Intact distal pulses. No edema. No aortic bruits. Pulm: No abnormalities on inspection. Clear to auscultation bilaterally. No wheezing/rhonchi. Normal effort. GI: Soft, nontender, nondistended. Normal active bowel sounds.

## 2019-06-14 ENCOUNTER — TELEPHONE (OUTPATIENT)
Dept: FAMILY MEDICINE CLINIC | Age: 44
End: 2019-06-14

## 2019-06-14 NOTE — TELEPHONE ENCOUNTER
Patient will see pcp 6/25/19. He is requesting a referral to have botox injections in his R hand and wrist to treat spacticity. He had this done in [place] previously. He also needs a referral to a podiatrist. He developed a callus on R foot while in [place] and it has split open and is giving him trouble. He is using Epsom soaks currently.

## 2019-06-25 ENCOUNTER — OFFICE VISIT (OUTPATIENT)
Dept: FAMILY MEDICINE CLINIC | Age: 44
End: 2019-06-25

## 2019-06-25 VITALS
WEIGHT: 183 LBS | HEIGHT: 72 IN | DIASTOLIC BLOOD PRESSURE: 80 MMHG | SYSTOLIC BLOOD PRESSURE: 120 MMHG | HEART RATE: 73 BPM | OXYGEN SATURATION: 98 % | RESPIRATION RATE: 20 BRPM | BODY MASS INDEX: 24.79 KG/M2 | TEMPERATURE: 98.1 F

## 2019-06-25 DIAGNOSIS — R25.2 POST-TRAUMATIC SPASTICITY: ICD-10-CM

## 2019-06-25 DIAGNOSIS — L84 FOOT CALLUS: Primary | ICD-10-CM

## 2019-06-25 NOTE — PROGRESS NOTES
Mason Rodriguez is a 40 y.o. male (: 1975) presenting to address:    Chief Complaint   Patient presents with    Foot Problem     Right    Arm Problem     Right       Vitals:    19 1109   BP: 120/80   Pulse: 73   Resp: 20   Temp: 98.1 °F (36.7 °C)   TempSrc: Oral   SpO2: 98%   Weight: 183 lb (83 kg)   Height: 6' (1.829 m)   PainSc:   0 - No pain       Learning Assessment:     Learning Assessment 2018   PRIMARY LEARNER Patient   BARRIERS PRIMARY LEARNER NONE   CO-LEARNER CAREGIVER No   PRIMARY LANGUAGE ENGLISH    NEED No   LEARNER PREFERENCE PRIMARY READING   LEARNING SPECIAL TOPICS none   ANSWERED BY patient   RELATIONSHIP SELF   ASSESSMENT COMMENT n/a     Depression Screening:     3 most recent PHQ Screens 2019   Little interest or pleasure in doing things Not at all   Feeling down, depressed, irritable, or hopeless Not at all   Total Score PHQ 2 0     Fall Risk Assessment:     Fall Risk Assessment, last 12 mths 2018   Able to walk? Yes   Fall in past 12 months? No     Abuse Screening:     Abuse Screening Questionnaire 2018   Do you ever feel afraid of your partner? N   Are you in a relationship with someone who physically or mentally threatens you? N   Is it safe for you to go home? Y     Coordination of Care Questionaire:   1. Have you been to the ER, urgent care clinic since your last visit? Hospitalized since your last visit? YES- [facility]    2. Have you seen or consulted any other health care providers outside of the [address] [place] [address] since your last visit? Include any pap smears or colon screening. YES- physician in [place]    Advanced Directive:   1. Do you have an Advanced Directive? YES    2. Would you like information on Advanced Directives?  NO

## 2019-06-25 NOTE — PATIENT INSTRUCTIONS
Make an appointment with:  
 
[facility] [address] [facility], [facility] [address] Zarate [facility] Psychotherapy [place], [address] [place] [address] 
[phone]

## 2019-06-25 NOTE — PROGRESS NOTES
Assessment/Plan:    1. Foot callus  -referral podiatry  - REFERRAL TO PODIATRY    2. Post-traumatic spasticity  -looking for botox. Will refer to neuro for this issue.  - REFERRAL TO NEUROLOGY      The plan was discussed with the patient. The patient verbalized understanding and is in agreement with the plan. All medication potential side effects were discussed with the patient. Health Maintenance:   Health Maintenance   Topic Date Due    Influenza Age 5 to Adult  08/01/2019    MEDICARE YEARLY EXAM  12/20/2019    DTaP/Tdap/Td series (2 - Td) 12/11/2028    Pneumococcal 0-64 years  Aged [address] [facility] Seth Orantes is a 40 y.o. male and presents with Foot Problem (Right) and Arm Problem (Right)     Subjective:  Pt states his R big toe had a callus that split open many years ago. It's been reoccurring since then. He's tried gluing it w/o improvement. It's not painful and doesn't itch. He is also requesting injections of botox for his spasticity. He had previously been getting these to help with RUE spasm after TBI.      ROS:  Constitutional: No recent weight change. No weakness/fatigue. No f/c. Cardiovascular: No CP/palpitations. No JOHNSON/orthopnea/PND. Respiratory: No cough/sputum, dyspnea, wheezing. Musculoskeletal: No joint pain/stiffness. No muscle pain/tenderness. The problem list was updated as a part of today's visit. 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     SH:  Social History     Tobacco Use    Smoking status: Never Smoker    Smokeless tobacco: Never Used   Substance Use Topics    Alcohol use: Yes     Comment: socially    Drug use: No       Medications/Allergies:  Current Outpatient Medications on File Prior to Visit   Medication Sig Dispense Refill    ibuprofen (ADVIL) 200 mg tablet Take  by mouth.  nefazodone (SERZONE) 200 mg tablet Take  by mouth two (2) times a day.  ibuprofen (MOTRIN) 800 mg tablet Take 1 Tab by mouth every eight (8) hours as needed for Pain. 60 Tab 0    trihexyphenidyl (ARTANE) 2 mg tablet Take  by mouth two (2) times a day.  propranolol (INNOPRAN XL) 120 mg cp24 Take  by mouth daily.  baclofen (LIORESAL) 10 mg tablet Take 40 mg by mouth two (2) times a day.  topiramate (TOPAMAX) 25 mg tablet Take 12.5 mg by mouth once. No current facility-administered medications on file prior to visit. No Known Allergies    Objective:  Visit Vitals  /80 (BP 1 Location: Left arm, BP Patient Position: Sitting)   Pulse 73   Temp 98.1 °F (36.7 °C) (Oral)   Resp 20   Ht 6' (1.829 m)   Wt 183 lb (83 kg)   SpO2 98%   BMI 24.82 kg/m²      Constitutional: Well developed, nourished, no distress, alert   CV: S1, S2.  RRR. No murmurs/rubs. No edema. Pulm: No abnormalities on inspection. Clear to auscultation bilaterally. No wheezing/rhonchi. Normal effort. MS: Increased tone RUE, with clenched fist.    Neuro:  Speech stuttering.  Intention tremor LUE   Skin: Callus with crack on medial R big toe

## 2019-07-24 ENCOUNTER — TELEPHONE (OUTPATIENT)
Dept: FAMILY MEDICINE CLINIC | Age: 44
End: 2019-07-24
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
## 2019-12-17 ENCOUNTER — HOSPITAL ENCOUNTER (OUTPATIENT)
Dept: LAB | Age: 44
Discharge: HOME | End: 2019-12-17
Payer: MEDICARE

## 2019-12-17 ENCOUNTER — OFFICE VISIT (OUTPATIENT)
Dept: FAMILY MEDICINE CLINIC | Age: 44
End: 2019-12-17

## 2019-12-17 VITALS
HEIGHT: 72 IN | SYSTOLIC BLOOD PRESSURE: 110 MMHG | RESPIRATION RATE: 17 BRPM | OXYGEN SATURATION: 96 % | TEMPERATURE: 98.5 F | HEART RATE: 65 BPM | WEIGHT: 185.6 LBS | DIASTOLIC BLOOD PRESSURE: 66 MMHG | BODY MASS INDEX: 25.14 KG/M2

## 2019-12-17 DIAGNOSIS — G89.29 CHRONIC SHOULDER PAIN, UNSPECIFIED LATERALITY: ICD-10-CM

## 2019-12-17 DIAGNOSIS — Z13.6 SCREENING, ISCHEMIC HEART DISEASE: ICD-10-CM

## 2019-12-17 DIAGNOSIS — E55.9 VITAMIN D DEFICIENCY: ICD-10-CM

## 2019-12-17 DIAGNOSIS — Z00.00 MEDICARE ANNUAL WELLNESS VISIT, SUBSEQUENT: Primary | ICD-10-CM

## 2019-12-17 DIAGNOSIS — Z79.899 ENCOUNTER FOR LONG-TERM (CURRENT) USE OF OTHER MEDICATIONS: ICD-10-CM

## 2019-12-17 DIAGNOSIS — M25.519 CHRONIC SHOULDER PAIN, UNSPECIFIED LATERALITY: ICD-10-CM

## 2019-12-17 LAB
25(OH)D3 SERPL-MCNC: 24.2 NG/ML (ref 30–100)
ALBUMIN SERPL-MCNC: 3.8 G/DL (ref 3.4–5)
ALBUMIN/GLOB SERPL: 1.2 {RATIO} (ref 0.8–1.7)
ALP SERPL-CCNC: 72 U/L (ref 45–117)
ALT SERPL-CCNC: 28 U/L (ref 16–61)
ANION GAP SERPL CALC-SCNC: 5 MMOL/L (ref 3–18)
AST SERPL-CCNC: 14 U/L (ref 10–38)
BILIRUB SERPL-MCNC: 0.5 MG/DL (ref 0.2–1)
BUN SERPL-MCNC: 20 MG/DL (ref 7–18)
BUN/CREAT SERPL: 23 (ref 12–20)
CALCIUM SERPL-MCNC: 8.8 MG/DL (ref 8.5–10.1)
CHLORIDE SERPL-SCNC: 110 MMOL/L (ref 100–111)
CHOLEST SERPL-MCNC: 183 MG/DL
CO2 SERPL-SCNC: 28 MMOL/L (ref 21–32)
CREAT SERPL-MCNC: 0.86 MG/DL (ref 0.6–1.3)
ERYTHROCYTE [DISTWIDTH] IN BLOOD BY AUTOMATED COUNT: 13.6 % (ref 11.6–14.5)
GLOBULIN SER CALC-MCNC: 3.1 G/DL (ref 2–4)
GLUCOSE SERPL-MCNC: 85 MG/DL (ref 74–99)
HCT VFR BLD AUTO: 45.8 % (ref 36–48)
HDLC SERPL-MCNC: 45 MG/DL (ref 40–60)
HDLC SERPL: 4.1 {RATIO} (ref 0–5)
HGB BLD-MCNC: 15 G/DL (ref 13–16)
LDLC SERPL CALC-MCNC: 113.6 MG/DL (ref 0–100)
LIPID PROFILE,FLP: ABNORMAL
MCH RBC QN AUTO: 29.3 PG (ref 24–34)
MCHC RBC AUTO-ENTMCNC: 32.8 G/DL (ref 31–37)
MCV RBC AUTO: 89.5 FL (ref 74–97)
PLATELET # BLD AUTO: 187 K/UL (ref 135–420)
PMV BLD AUTO: 12.7 FL (ref 9.2–11.8)
POTASSIUM SERPL-SCNC: 4.6 MMOL/L (ref 3.5–5.5)
PROT SERPL-MCNC: 6.9 G/DL (ref 6.4–8.2)
RBC # BLD AUTO: 5.12 M/UL (ref 4.7–5.5)
SODIUM SERPL-SCNC: 143 MMOL/L (ref 136–145)
TRIGL SERPL-MCNC: 122 MG/DL (ref ?–150)
VLDLC SERPL CALC-MCNC: 24.4 MG/DL
WBC # BLD AUTO: 5.3 K/UL (ref 4.6–13.2)

## 2019-12-17 PROCEDURE — 80061 LIPID PANEL: CPT

## 2019-12-17 PROCEDURE — 85027 COMPLETE CBC AUTOMATED: CPT

## 2019-12-17 PROCEDURE — 82306 VITAMIN D 25 HYDROXY: CPT

## 2019-12-17 PROCEDURE — 80053 COMPREHEN METABOLIC PANEL: CPT

## 2019-12-17 PROCEDURE — 36415 COLL VENOUS BLD VENIPUNCTURE: CPT

## 2019-12-17 NOTE — PATIENT INSTRUCTIONS
Medicare Wellness Visit, Male The best way to live healthy is to have a lifestyle where you eat a well-balanced diet, exercise regularly, limit alcohol use, and quit all forms of tobacco/nicotine, if applicable. Regular preventive services are another way to keep healthy. Preventive services (vaccines, screening tests, monitoring & exams) can help personalize your care plan, which helps you manage your own care. Screening tests can find health problems at the earliest stages, when they are easiest to treat. [facility] follows the current, evidence-based guidelines published by the [facility] Sammie ([facility]) when recommending preventive services for our patients. Because we follow these guidelines, sometimes recommendations change over time as research supports it. (For example, a prostate screening blood test is no longer routinely recommended for men with no symptoms). Of course, you and your doctor may decide to screen more often for some diseases, based on your risk and co-morbidities (chronic disease you are already diagnosed with). Preventive services for you include: - Medicare offers their members a free annual wellness visit, which is time for you and your primary care provider to discuss and plan for your preventive service needs. Take advantage of this benefit every year! 
-All adults over age 72 should receive the recommended pneumonia vaccines. Current USPSTF guidelines recommend a series of two vaccines for the best pneumonia protection.  
-All adults should have a flu vaccine yearly and tetanus vaccine every 10 years. 
-All adults age 48 and older should receive the shingles vaccines (series of two vaccines).       
-All adults age 38-68 who are overweight should have a diabetes screening test once every three years.  
-Other screening tests & preventive services for persons with diabetes include: an eye exam to screen for diabetic retinopathy, a kidney function test, a foot exam, and stricter control over your cholesterol.  
-Cardiovascular screening for adults with routine risk involves an electrocardiogram (ECG) at intervals determined by the provider.  
-Colorectal cancer screening should be done for adults age 54-65 with no increased risk factors for colorectal cancer. There are a number of acceptable methods of screening for this type of cancer. Each test has its own benefits and drawbacks. Discuss with your provider what is most appropriate for you during your annual wellness visit. The different tests include: colonoscopy (considered the best screening method), a fecal occult blood test, a fecal DNA test, and sigmoidoscopy. 
-All adults born between [facility] should be screened once for Hepatitis C. 
-An Abdominal Aortic Aneurysm (AAA) Screening is recommended for men age 73-68 who has ever smoked in their lifetime. Here is a list of your current Health Maintenance items (your personalized list of preventive services) with a due date: 
Health Maintenance Due Topic Date Due  
 Flu Vaccine  08/01/2019 [address] [place] [address] Annual Well Visit  12/20/2019 Advance Directives: Care Instructions Your Care Instructions An advance directive is a legal way to state your wishes at the end of your life. It tells your family and your doctor what to do if you can no longer say what you want. There are two main types of advance directives. You can change them any time that your wishes change. · A living will tells your family and your doctor your wishes about life support and other treatment. · A durable power of  for health care lets you name a person to make treatment decisions for you when you can't speak for yourself. This person is called a health care agent.  
If you do not have an advance directive, decisions about your medical care may be made by a doctor or a  who doesn't know you. It may help to think of an advance directive as a gift to the people who care for you. If you have one, they won't have to make tough decisions by themselves. Follow-up care is a key part of your treatment and safety. Be sure to make and go to all appointments, and call your doctor if you are having problems. It's also a good idea to know your test results and keep a list of the medicines you take. How can you care for yourself at home? · Discuss your wishes with your loved ones and your doctor. This way, there are no surprises. · Many states have a unique form. Or you might use a universal form that has been approved by many states. This kind of form can sometimes be completed and stored online. Your electronic copy will then be available wherever you have a connection to the Internet. In most cases, doctors will respect your wishes even if you have a form from a different state. · You don't need a  to do an advance directive. But you may want to get legal advice. · Think about these questions when you prepare an advance directive: 
? Who do you want to make decisions about your medical care if you are not able to? Many people choose a family member or close friend. ? Do you know enough about life support methods that might be used? If not, talk to your doctor so you understand. ? What are you most afraid of that might happen? You might be afraid of having pain, losing your independence, or being kept alive by machines. ? Where would you prefer to die? Choices include your home, a hospital, or a nursing home. ? Would you like to have information about hospice care to support you and your family? ? Do you want to donate organs when you die? ? Do you want certain [employer] practices performed before you die? If so, put your wishes in the advance directive. · Read your advance directive every year, and make changes as needed. When should you call for help? Be sure to contact your doctor if you have any questions. Where can you learn more? Go to http://joe-kenzie.info/. Enter R264 in the search box to learn more about \"Advance Directives: Care Instructions. \" Current as of: April 1, 2019 Content Version: 12.2 © 7345-1279 [employer]. Care instructions adapted under license by [employer] (which disclaims liability or warranty for this information). If you have questions about a medical condition or this instruction, always ask your healthcare professional. [address] any warranty or liability for your use of this information.

## 2019-12-17 NOTE — PROGRESS NOTES
Having left knee injections at [facility], 4th one will be tomorrow. No ED or urgent care     Esthela Beckwith is a 40 y.o. male (: 1975) presenting to address:    Chief Complaint   Patient presents with    Annual Wellness Visit       Vitals:    19 1105   BP: 110/66   Pulse: 65   Resp: 17   Temp: 98.5 °F (36.9 °C)   TempSrc: Oral   SpO2: 96%   Weight: 185 lb 9.6 oz (84.2 kg)   Height: 5' 11.5\" (1.816 m)   PainSc:   0 - No pain       Hearing/Vision:      Visual Acuity Screening    Right eye Left eye Both eyes   Without correction:      With correction: 20/30 20/30 20/30       Learning Assessment:     Learning Assessment 2018   PRIMARY LEARNER Patient   BARRIERS PRIMARY LEARNER NONE   CO-LEARNER CAREGIVER No   PRIMARY LANGUAGE ENGLISH    NEED No   LEARNER PREFERENCE PRIMARY READING   LEARNING SPECIAL TOPICS none   ANSWERED BY patient   RELATIONSHIP SELF   ASSESSMENT COMMENT n/a     Depression Screening:     3 most recent PHQ Screens 2019   Little interest or pleasure in doing things Not at all   Feeling down, depressed, irritable, or hopeless Not at all   Total Score PHQ 2 0     Fall Risk Assessment:     Fall Risk Assessment, last 12 mths 2018   Able to walk? Yes   Fall in past 12 months? No     Abuse Screening:     Abuse Screening Questionnaire 2019   Do you ever feel afraid of your partner? N   Are you in a relationship with someone who physically or mentally threatens you? N   Is it safe for you to go home? Y     Coordination of Care Questionaire:   1. Have you been to the ER, urgent care clinic since your last visit? Hospitalized since your last visit? No     2. Have you seen or consulted any other health care providers outside of the [address] [place] [address] since your last visit? Include any pap smears or colon screening. Yes   [facility] 2019 appt     Advanced Directive:   1. Do you have an Advanced Directive? No     2.  Would you like information on Advanced Directives? No     There are no preventive care reminders to display for this patient.

## 2019-12-17 NOTE — PROGRESS NOTES
This is the Subsequent Medicare Annual Wellness Exam, performed 12 months or more after the Initial AWV or the last Subsequent AWV    I have reviewed the patient's medical history in detail and updated the computerized patient record. 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  nefazodone (SERZONE) 200 mg tablet Take  by mouth two (2) times a day.  ibuprofen (MOTRIN) 800 mg tablet Take 1 Tab by mouth every eight (8) hours as needed for Pain. 60 Tab 0    trihexyphenidyl (ARTANE) 2 mg tablet Take  by mouth two (2) times a day.  baclofen (LIORESAL) 10 mg tablet Take 40 mg by mouth two (2) times a day.  topiramate (TOPAMAX) 25 mg tablet Take 12.5 mg by mouth once. No Known Allergies    Family History   Problem Relation Age of Onset    Heart Disease Mother     No Known Problems Father      Social History     Tobacco Use    Smoking status: Never Smoker    Smokeless tobacco: Never Used   Substance Use Topics    Alcohol use: Yes     Comment: socially       Depression Risk Factor Screening:     3 most recent PHQ Screens 12/17/2019   Little interest or pleasure in doing things Not at all   Feeling down, depressed, irritable, or hopeless Not at all   Total Score PHQ 2 0       Alcohol Risk Factor Screening (MALE < 65):    Do you average more than 2 drinks per night or 14 drinks a week: No    On any one occasion in the past three months have you have had more than 4 drinks containing alcohol: No      Functional Ability and Level of Safety:   Hearing: Hearing is good. Activities of Daily Living: The home contains: handrails  Patient needs help with:  walking    Ambulation: with mild difficulty    Fall Risk:  Fall Risk Assessment, last 12 mths 12/17/2019   Able to walk? Yes   Fall in past 12 months? Yes   Fall with injury? No   Number of falls in past 12 months 1   Fall Risk Score 1     Abuse Screen:  Patient is not abused    Cognitive Screening   Has your family/caregiver stated any concerns about your memory: no  Cognitive Screening: Normal - . Patient Care Team   Patient Care Team:  Virgil Jones MD as PCP - General (Internal Medicine)  Virgil Jones MD as PCP - [facility]    Assessment/Plan   Education and counseling provided:  Are appropriate based on today's review and evaluation  End-of-Life planning (with patient's consent)    Diagnoses and all orders for this visit:    1. Medicare annual wellness visit, subsequent    2. Encounter for long-term (current) use of other medications- needs copy of labs sent to [phone] [facility] in [facility]  -     CBC W/O DIFF; Future  -     METABOLIC PANEL, COMPREHENSIVE; Future    3. Screening, ischemic heart disease  -     LIPID PANEL; Future    4. Vitamin D deficiency  -     VITAMIN D, 25 HYDROXY; Future    5. Chronic shoulder pain, unspecified laterality  -     REFERRAL TO ORTHOPEDICS        There are no preventive care reminders to display for this patient.

## 2019-12-30 ENCOUNTER — OFFICE VISIT (OUTPATIENT)
Dept: ORTHOPEDIC SURGERY | Facility: CLINIC | Age: 44
End: 2019-12-30

## 2019-12-30 VITALS
RESPIRATION RATE: 18 BRPM | BODY MASS INDEX: 24.92 KG/M2 | HEART RATE: 79 BPM | HEIGHT: 72 IN | SYSTOLIC BLOOD PRESSURE: 138 MMHG | WEIGHT: 184 LBS | DIASTOLIC BLOOD PRESSURE: 83 MMHG | TEMPERATURE: 97.9 F | OXYGEN SATURATION: 98 %

## 2019-12-30 DIAGNOSIS — M12.812 ROTATOR CUFF ARTHROPATHY OF LEFT SHOULDER: Primary | ICD-10-CM

## 2019-12-30 DIAGNOSIS — G89.29 CHRONIC PAIN OF LEFT KNEE: ICD-10-CM

## 2019-12-30 DIAGNOSIS — G81.13 SPASTIC HEMIPLEGIA OF RIGHT NONDOMINANT SIDE DUE TO NONCEREBROVASCULAR ETIOLOGY (HCC): ICD-10-CM

## 2019-12-30 DIAGNOSIS — M25.562 CHRONIC PAIN OF LEFT KNEE: ICD-10-CM

## 2019-12-30 DIAGNOSIS — G89.29 CHRONIC LEFT SHOULDER PAIN: ICD-10-CM

## 2019-12-30 DIAGNOSIS — Z87.820 HISTORY OF TRAUMATIC BRAIN INJURY: ICD-10-CM

## 2019-12-30 DIAGNOSIS — M25.512 CHRONIC LEFT SHOULDER PAIN: ICD-10-CM

## 2019-12-30 RX ORDER — BETAMETHASONE SODIUM PHOSPHATE AND BETAMETHASONE ACETATE 3; 3 MG/ML; MG/ML
6 INJECTION, SUSPENSION INTRA-ARTICULAR; INTRALESIONAL; INTRAMUSCULAR; SOFT TISSUE ONCE
Qty: 0.5 ML | Refills: 0
Start: 2019-12-30 | End: 2019-12-30
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
## 2020-01-24 ENCOUNTER — HOSPITAL ENCOUNTER (OUTPATIENT)
Dept: PHYSICAL THERAPY | Age: 45
End: 2020-01-24
Payer: MEDICARE

## 2020-01-31 ENCOUNTER — HOSPITAL ENCOUNTER (OUTPATIENT)
Dept: PHYSICAL THERAPY | Age: 45
Discharge: HOME | End: 2020-01-31
Payer: MEDICARE

## 2020-01-31 PROCEDURE — 97162 PT EVAL MOD COMPLEX 30 MIN: CPT

## 2020-01-31 PROCEDURE — 97110 THERAPEUTIC EXERCISES: CPT

## 2020-01-31 NOTE — PROGRESS NOTES
[address] [place] [address], [address] [place] [address] - Phone: [phone]  Fax: [phone]  ADORE Nicohle [address] [facility]  Patient Name: Apurva Tovar : 1975   Medical   Diagnosis: Left shoulder pain [M25.512] Treatment Diagnosis: Left shoulder pain [M25.512]   Onset Date: chronic     Referral Source: Jeancarlos Irvin MD Start of Care [facility]): 2020   Prior Hospitalization: See medical history Provider #: [id number]   Prior Level of Function: Decreased pain and improved mobility and strength LUE for functional overhead reaching activities/ADLs   Comorbidities: 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   ========================================================================  Assessment / key information: Patient is a 40 y.o. male who presents to [facility] with a diagnosis of Left shoulder pain [M25.512]. Patient is his own historian. Living situation is as follows: with wife in apt. Occupation: NA. Pt presents with c/o chronic left shoulder pain, decreased ROM, and decreased strength of several years duration which has not improved. Pt is LHD and presents with longstanding right shoulder spasticity and resting in IR, adduction, pronation with wrist flexion. Pt has spent \"years in OT and it is what it is, bc of the TBI. \" As per pt, \"my left arm is my bread and butter and I need it to function normally\". Pain is localized to the anterior [address] joint and increases with flexion, abduction, horizontal adduction and when donning/doffing his coat and reaching into cabinets.  Pt reports negative xray findings and reports having an MRI completed but is unsure of results saying \"its rotator cuff and I was told I will most likely need a shoulder replacement in the future. \". Pt is on baclofen for his right UE/LE. He dons a right AFO. He is currently getting rejuvinix for his left knee which has helped. He denies any recent LOB/falls (past 3 months). No c/o radicular s/s or numbness in the left UE. Current Deficits include: pain, decreased mobility, decreased strength, and decreased postural awareness with resulting limitations in ADL's and in functional abilities. Patient will benefit from a comprehensive POC/HEP to address impairments and restore function in order to return to prior level of function and prevent secondary impairments. Impairments are as follows:   Pain: current pain level1/10, pain level at worst5/10, and pain level at best0/10; +TTP left SS tendon  Posture right trunk lean in sitting with elevated right ilium and protracted left scapula with IR of left GH joint; Observations Significant scapular dyskinesis is evident, with UT substitution and premature and excessive upward rotation with shoulder elevation; left UE resting tremor  Gait spastic diplegia evident with significantly ataxic gait and RLE circumduction, and right UE as positioned above with left lateral trunk lean and trendelenburg   AROM/PROM right UE not tested secondary to spastic positioning   Left GH joint: flex 150/160 abd 150/160 ER Full IR Full  Strength   Left GH joint: flexion 3+ abd 3+ ER 4- IR 4-  Left lower trap 2+  Special Tests   LUE grossly intact to LT and proprioception  Unable to elicit C5 reflex secondary to torn biceps but 2+ Left C6 and C7  + Jyoti Brooks Left UE  + winging and deviations as above  + drop arm   Functional Tests see above  Functional Deficits include: see above. Patient's FOTO score was a 59/100 indicating decreased function.  Patient will benefit from a POC addressing such impairments and limitations in order to improve quality of life and return to PLOF.    ========================================================================  Eval Complexity: History: HIGH Complexity :3+ comorbidities / personal factors will impact the outcome/ POC Exam:HIGH Complexity : 4+ Standardized tests and measures addressing body structure, function, activity limitation and / or participation in recreation  Presentation: MEDIUM Complexity : Evolving with changing characteristics  Clinical Decision Making:MEDIUM Complexity : FOTO score of 26-74Overall Complexity:MEDIUM  Problem List: pain affecting function, decrease ROM, decrease strength, impaired gait/ balance, decrease ADL/ functional abilitiies, decrease activity tolerance and decrease flexibility/ joint mobility   Treatment Plan may include any combination of the following: Therapeutic exercise, Therapeutic activities, Neuromuscular re-education, Physical agent/modality, Gait/balance training, Manual therapy, Patient education and Functional mobility training  Patient / Family readiness to learn indicated by: asking questions  Persons(s) to be included in education: patient (P)  Barriers to Learning/Limitations: yes;  other late effect TBI; slowed speech  Measures taken: A HEP was initiated to assist with POC in restoring function   Patient Goal (s): \"get my left shoulder stronger\"   Patient self reported health status: good  Rehabilitation Potential: good   Short Term Goals: To be accomplished in  2  treatments:  1. Pt will be compliant with HEP for symptom management at home and independent in such for self management at discharge.  Long Term Goals: To be accomplished in  10  treatments:  1. Pt will demonstrate an increased FOTO score to 74/100 in order to improve function  2. Pt will demonstrate increased left GH ER/IR strength to 4+/5 in order to improve GH stabilization for functional ADLs  3. Pt will demonstrate increased left Lower trap strength to > 3+/5 in order to improve scapular stabilization for ADLs  4.  Pt will demonstrate increased AROM into left GH flex/abduction to > 160 degrees in order to more easily don his coat    Frequency / Duration:     Patient to be seen  2  times per week for 10  treatments:  Patient / Caregiver education and instruction: exercises    Therapist Signature: Lurdes Macario PT Date: 7/18/8212   Certification Period: 90 days  1/31/20-4/29/20 Time: 8:03 AM   ========================================================================  I certify that the above Physical Therapy Services are being furnished while the patient is under my care. I agree with the treatment plan and certify that this therapy is necessary. Physician Signature:        Date:       Time:   Please sign and return to [facility], [facility]. or you may fax the signed copy to [phone]. Thank you.

## 2020-01-31 NOTE — PROGRESS NOTES
PHYSICAL THERAPY - DAILY TREATMENT NOTE    Patient Name: Polly Jeff        Date: 2020  : 1975   yes Patient  Verified  Visit #:   1   of   10  Insurance: Payor: Sonia Lopez / [facility] / Product Type: Managed Care Medicare /      In time: 13 Out time:    Total Treatment Time: 53     Medicare/BCBS Time Tracking (below)   Total Timed Codes (min):  10 1:1 Treatment Time:  53     TREATMENT AREA =  Left shoulder pain [M25.512]    SUBJECTIVE  Pain Level (on 0 to 10 scale):  1  / 10   Medication Changes/New allergies or changes in medical history, any new surgeries or procedures?    no  If yes, update Summary List   Subjective Functional Status/Changes:  []  No changes reported   See Eval for subjective information and c/o. OBJECTIVE  10 min Therapeutic Exercise:  [x]  See flow sheet   Rationale:      increase ROM and increase strength to improve the patients ability to perform ADLs     Billed With/As:   [] TE   [x] TA   [] Neuro   [] Self Care Patient Education: [x] Review HEP    [] Progressed/Changed HEP based on:   [x] positioning   [x] body mechanics   [] transfers   [] heat/ice application    [] other:      Other Objective/Functional Measures:    See Eval     Post Treatment Pain Level (on 0 to 10) scale:   0  / 10     ASSESSMENT  Assessment/Changes in Function:     See Eval     []  See Progress Note/Recertification   Patient will continue to benefit from skilled PT services to modify and progress therapeutic interventions to attain remaining goals. Progress toward goals / Updated goals:  Pt denied sharp pain or red flags with initial eval or therapeutic ex. Pt denied pain at end of session.  See initial eval.      PLAN  []  Upgrade activities as tolerated yes Continue plan of care   []  Discharge due to :    []  Other:      Therapist: Ion Camacho PT    Date: 2020 Time: 8:03 AM     Future Appointments   Date Time Provider Department [employer]   1/31/2020 11:00  [address] [place] [address]

## 2020-02-03 ENCOUNTER — HOSPITAL ENCOUNTER (OUTPATIENT)
Dept: PHYSICAL THERAPY | Age: 45
Discharge: HOME | End: 2020-02-03
Payer: MEDICARE

## 2020-02-03 PROCEDURE — 97110 THERAPEUTIC EXERCISES: CPT

## 2020-02-03 PROCEDURE — 97140 MANUAL THERAPY 1/> REGIONS: CPT

## 2020-02-03 NOTE — PROGRESS NOTES
PHYSICAL THERAPY - DAILY TREATMENT NOTE    Patient Name: Chase Alexander        Date: 2/3/2020  : 1975   yes Patient  Verified  Visit #:   2   of   10  Insurance: Payor: Josefina Hammond / Plan: [facility] [employer] MEDICARE COMPLETE / Product Type: Managed Care Medicare /      In time: 229 Out time: 314   Total Treatment Time: 45     Medicare/BCBS Time Tracking (below)   Total Timed Codes (min):  45 1:1 Treatment Time:  41     TREATMENT AREA =  Left shoulder pain [M25.512]    SUBJECTIVE  Pain Level (on 0 to 10 scale):  0  / 10   Medication Changes/New allergies or changes in medical history, any new surgeries or procedures?    no  If yes, update Summary List   Subjective Functional Status/Changes:  []  No changes reported     Patient states he did his home exercises and they seem to help as he is not experiencing any pain today. OBJECTIVE    35 min Therapeutic Exercise:  [x]  See flow sheet   Rationale:      increase ROM and increase strength to improve the patients ability to perform reaching activities. 10 min Manual Therapy: STM to L posterior RC, L shoulder PROM all planes   Rationale:      decrease pain, increase ROM, increase tissue extensibility and decrease trigger points to improve patient's ability to perform carrying activities. Billed With/As:   [x] TE   [] TA   [] Neuro   [] Self Care Patient Education: [x] Review HEP    [] Progressed/Changed HEP based on:   [] positioning   [] body mechanics   [] transfers   [] heat/ice application    [] other:      Other Objective/Functional Measures:    1:1 TE 32'  Progressed therapy program per flowsheet in order to improve L shoulder ROM, mobility and strength  Patient demonstrated good knowledge of HEP indicating good carry over from IE     Post Treatment Pain Level (on 0 to 10) scale:   0  / 10     ASSESSMENT  Assessment/Changes in Function:     Patient denied changes in symptoms post session.  Educated patient on possible increase in soreness related to new exercise program. Also educated to continue with HEP as prescribed at this time and will progress when appropriate. []  See Progress Note/Recertification   Patient will continue to benefit from skilled PT services to modify and progress therapeutic interventions, address functional mobility deficits, address ROM deficits, address strength deficits, analyze and address soft tissue restrictions, analyze and cue movement patterns, analyze and modify body mechanics/ergonomics and assess and modify postural abnormalities to attain remaining goals.    Progress toward goals / Updated goals:    Progressing well with STG#1     PLAN  [x]  Upgrade activities as tolerated yes Continue plan of care   []  Discharge due to :    []  Other:      Therapist: Norris Castillo, PT    Date: 2/3/2020 Time: 3:49 PM     Future Appointments   Date Time Provider Ayaka Farnsworth   2/6/2020  2:00 PM Tonye [facility]   2/10/2020  2:00 PM Chester Balzarine, PT [facility]   2/13/2020  3:00 PM Tonye [facility]   2/17/2020  2:00 PM Danish Balzarine, PT [facility]   2/20/2020  2:30 PM Danish Balzarine, PT [facility]   2/24/2020  2:00 PM Chester Balzarine, PT [facility]   2/27/2020  2:30 PM Chester Balzarine, PT [facility]

## 2020-02-06 ENCOUNTER — HOSPITAL ENCOUNTER (OUTPATIENT)
Dept: PHYSICAL THERAPY | Age: 45
Discharge: HOME | End: 2020-02-06
Payer: MEDICARE

## 2020-02-06 PROCEDURE — 97110 THERAPEUTIC EXERCISES: CPT

## 2020-02-06 PROCEDURE — 97140 MANUAL THERAPY 1/> REGIONS: CPT

## 2020-02-06 NOTE — PROGRESS NOTES
PHYSICAL THERAPY - DAILY TREATMENT NOTE    Patient Name: Polly Jeff        Date: 2020  : 1975   yes Patient  Verified  Visit #:   3   of   10  Insurance: Payor: Sonia Lopez / Plan: [facility] MEDICARE COMPLETE / Product Type: Managed Care Medicare /      In time: 2 Out time: 250   Total Treatment Time: 50     Medicare/BCBS Time Tracking (below)   Total Timed Codes (min):  50 1:1 Treatment Time:  40     TREATMENT AREA =  Left shoulder pain [M25.512]    SUBJECTIVE  Pain Level (on 0 to 10 scale):  0  / 10   Medication Changes/New allergies or changes in medical history, any new surgeries or procedures?    no  If yes, update Summary List   Subjective Functional Status/Changes:  []  No changes reported     Pt reports lying on his (L) shoulder and getting a sharp, shooting pain down to his hand. States it lasted only a few seconds. OBJECTIVE  35 (25)  min Therapeutic Exercise:  [x]  See flow sheet   Rationale:      increase ROM, increase strength and improve coordination to improve the patients ability to perform pain free ADLs. 15 min Manual Therapy: TPR (L) periscapular mms. Rationale:      decrease pain, increase ROM, increase tissue extensibility and decrease trigger points to improve patient's ability to perform pain free ADLs. Billed With/As:   [x] TE   [] TA   [] Neuro   [] Self Care Patient Education: [x] Review HEP    [] Progressed/Changed HEP based on:   [] positioning   [] body mechanics   [] transfers   [] heat/ice application    [] other:      Other Objective/Functional Measures: Therex per flow sheet. Post Treatment Pain Level (on 0 to 10) scale:   0   / 10     ASSESSMENT  Assessment/Changes in Function:     No exacerbation of symptoms with today's session.       []  See Progress Note/Recertification   Patient will continue to benefit from skilled PT services to modify and progress therapeutic interventions, address functional mobility deficits, address ROM deficits, address strength deficits, analyze and address soft tissue restrictions, analyze and cue movement patterns and analyze and modify body mechanics/ergonomics to attain remaining goals. Progress toward goals / Updated goals:    No exacerbation of symptoms with today's session.       PLAN  [x]  Upgrade activities as tolerated yes Continue plan of care   []  Discharge due to :    []  Other:      Therapist: Deandre Christopher PTA    Date: 2/6/2020 Time: 2:08 PM     Future Appointments   Date Time Provider Ayaka Farnsworth   2/10/2020  2:00 PM Lucy Hinojosa [facility]   2/13/2020  3:00 PM Cyn Osborne [facility]   2/17/2020  2:00 PM Arcos Ania, PT [facility]   2/20/2020  2:30 PM Arcos Ania, PT [facility]   2/24/2020  2:00 PM Arcos Ania, PT [facility]   2/27/2020  2:30 PM Arcos Ania, PT [facility]

## 2020-02-10 ENCOUNTER — HOSPITAL ENCOUNTER (OUTPATIENT)
Dept: PHYSICAL THERAPY | Age: 45
Discharge: HOME | End: 2020-02-10
Payer: MEDICARE

## 2020-02-10 PROCEDURE — 97140 MANUAL THERAPY 1/> REGIONS: CPT

## 2020-02-10 PROCEDURE — 97110 THERAPEUTIC EXERCISES: CPT

## 2020-02-10 NOTE — PROGRESS NOTES
PHYSICAL THERAPY - DAILY TREATMENT NOTE    Patient Name: Rafael Ahmadi        Date: 2/10/2020  : 1975   yes Patient  Verified  Visit #:   4   of   10  Insurance: Payor: Harris Thomas / Plan: [facility] [employer] MEDICARE COMPLETE / Product Type: Managed Care Medicare /      In time: 202 Out time: 255   Total Treatment Time: 53     Medicare/BCBS Time Tracking (below)   Total Timed Codes (min):  53 1:1 Treatment Time:  25     TREATMENT AREA =  Left shoulder pain [M25.512]    SUBJECTIVE  Pain Level (on 0 to 10 scale):  0  / 10   Medication Changes/New allergies or changes in medical history, any new surgeries or procedures?    no  If yes, update Summary List   Subjective Functional Status/Changes:  []  No changes reported     Patient feels like his shoulder is getting better and he can move it easier. OBJECTIVE      38 min Therapeutic Exercise:  [x]  See flow sheet   Rationale:      increase ROM and increase strength to improve the patients ability to perform reaching activities. 15 min Manual Therapy: STM to L posterior RC, L shoulder PROM, rhythmic stabilization IR/ER at neutral 3x20   Rationale:      decrease pain, increase ROM, increase tissue extensibility and decrease trigger points to improve patient's ability to perform household activities. Billed With/As:   [x] TE   [] TA   [] Neuro   [] Self Care Patient Education: [x] Review HEP    [] Progressed/Changed HEP based on:   [] positioning   [] body mechanics   [] transfers   [] heat/ice application    [] other:      Other Objective/Functional Measures:    1:1 TE 10'  Patient presenting with weakness to L RC as evident by technique when raising arm above shoulder height, (+) UT over compensation  No limitations noted with PROM     Post Treatment Pain Level (on 0 to 10) scale:   0  / 10     ASSESSMENT  Assessment/Changes in Function:     Patient tolerated session well.  Patient would benefit from PT directed toward improving L RC strength and activation. []  See Progress Note/Recertification   Patient will continue to benefit from skilled PT services to modify and progress therapeutic interventions, address functional mobility deficits, address ROM deficits, address strength deficits, analyze and address soft tissue restrictions, analyze and cue movement patterns, analyze and modify body mechanics/ergonomics and assess and modify postural abnormalities to attain remaining goals.    Progress toward goals / Updated goals:    Progressing with LTG#4     PLAN  [x]  Upgrade activities as tolerated yes Continue plan of care   []  Discharge due to :    []  Other:      Therapist: Alta Rausch PT    Date: 2/10/2020 Time: 4:38 PM     Future Appointments   Date Time Provider Ayaka Farnsworth   2/13/2020  3:00 PM Devon Dodson [facility]   2/17/2020  2:00 PM Delwyn Adjutant, PT [facility]   2/20/2020  2:30 PM Delwyn Adjutant, PT [facility]   2/24/2020  2:00 PM Delwyn Adjutant, PT [facility]   2/27/2020  2:30 PM Delwyn Adjutant, PT [facility]

## 2020-02-13 ENCOUNTER — HOSPITAL ENCOUNTER (OUTPATIENT)
Dept: PHYSICAL THERAPY | Age: 45
Discharge: HOME | End: 2020-02-13
Payer: MEDICARE

## 2020-02-13 PROCEDURE — 97110 THERAPEUTIC EXERCISES: CPT

## 2020-02-13 PROCEDURE — 97140 MANUAL THERAPY 1/> REGIONS: CPT

## 2020-02-13 NOTE — PROGRESS NOTES
PHYSICAL THERAPY - DAILY TREATMENT NOTE    Patient Name: Joanna Thompson        Date: 2020  : 1975   yes Patient  Verified  Visit #:   5   of   10  Insurance: Payor: Leticia  / Plan: [facility] / Product Type: Managed Care Medicare /      In time: 3 Out time: 335   Total Treatment Time: 35     Medicare/BCBS Time Tracking (below)   Total Timed Codes (min):  35 1:1 Treatment Time:       TREATMENT AREA =  Left shoulder pain [M25.512]    SUBJECTIVE  Pain Level (on 0 to 10 scale):  0  / 10   Medication Changes/New allergies or changes in medical history, any new surgeries or procedures?    no  If yes, update Summary List   Subjective Functional Status/Changes:  []  No changes reported     No new complaints. OBJECTIVE  20 min Therapeutic Exercise:  [x]  See flow sheet   Rationale:      increase ROM, increase strength and improve coordination to improve the patients ability to perform pain free ADLs. 15 min Manual Therapy: STM/DTM (L) periscapular mms. Rationale:      decrease pain, increase ROM, increase tissue extensibility and decrease trigger points to improve patient's ability to perform pain free ADLs. Billed With/As:   [x] TE   [] TA   [] Neuro   [] Self Care Patient Education: [x] Review HEP    [] Progressed/Changed HEP based on:   [] positioning   [] body mechanics   [] transfers   [] heat/ice application    [] other:      Other Objective/Functional Measures: Therex per flow sheet. Post Treatment Pain Level (on 0 to 10) scale:   1  / 10     ASSESSMENT  Assessment/Changes in Function:     No exacerbation of symptoms with today's session.       []  See Progress Note/Recertification   Patient will continue to benefit from skilled PT services to modify and progress therapeutic interventions, address functional mobility deficits, address ROM deficits, address strength deficits, analyze and address soft tissue restrictions, analyze and cue movement patterns, analyze and modify body mechanics/ergonomics and assess and modify postural abnormalities to attain remaining goals. Progress toward goals / Updated goals:    No change in progress toward LTG's with today's session.       PLAN  [x]  Upgrade activities as tolerated yes Continue plan of care   []  Discharge due to :    []  Other:      Therapist: Tima Sherman PTA    Date: 2/13/2020 Time: 3:08 PM     Future Appointments   Date Time Provider Ayaka Farnsworth   2/17/2020  2:00 PM Jose Chun [facility]   2/20/2020  2:30 PM Kelly Marrero, PT [facility]   2/24/2020  2:00 PM Kelly Marrero, PT [facility]   2/27/2020  2:30 PM Kelly Marrero, PT [facility]

## 2020-02-17 ENCOUNTER — HOSPITAL ENCOUNTER (OUTPATIENT)
Dept: PHYSICAL THERAPY | Age: 45
Discharge: HOME | End: 2020-02-17
Payer: MEDICARE

## 2020-02-17 PROCEDURE — 97110 THERAPEUTIC EXERCISES: CPT

## 2020-02-17 PROCEDURE — 97140 MANUAL THERAPY 1/> REGIONS: CPT

## 2020-02-17 NOTE — PROGRESS NOTES
PHYSICAL THERAPY - DAILY TREATMENT NOTE    Patient Name: Mendel Graff        Date: 2020  : 1975   yes Patient  Verified  Visit #:   6   of   10  Insurance: Payor: Mateusz Junior / Plan: [facility] MEDICARE COMPLETE / Product Type: Managed Care Medicare /      In time: 155 Out time: 250   Total Treatment Time: 55     Medicare/BCBS Time Tracking (below)   Total Timed Codes (min):  55 1:1 Treatment Time:  40     TREATMENT AREA =  Left shoulder pain [M25.512]    SUBJECTIVE  Pain Level (on 0 to 10 scale):  0  / 10   Medication Changes/New allergies or changes in medical history, any new surgeries or procedures?    no  If yes, update Summary List   Subjective Functional Status/Changes:  []  No changes reported     Patient reports he has been doing all of his PT exercises at home and without complication. Patient feels like his shoulder is getting stronger. OBJECTIVE    45 min Therapeutic Exercise:  [x]  See flow sheet   Rationale:      increase ROM and increase strength to improve the patients ability to perform reaching activities. 10 min Manual Therapy: STM to L posterior RC, L deltoid; rhythmic stabilization at 90 degrees of shoulder flexion 3x20   Rationale:      decrease pain, increase ROM, increase tissue extensibility and decrease trigger points to improve patient's ability to perform carrying activities. Billed With/As:   [x] TE   [] TA   [] Neuro   [] Self Care Patient Education: [x] Review HEP    [] Progressed/Changed HEP based on:   [] positioning   [] body mechanics   [] transfers   [] heat/ice application    [] other:      Other Objective/Functional Measures:    1:1 TE 30'  Increased weight with SL ER and abduction to 2#  Progressed RTC isometric to band IR/ER with GTB this session     Post Treatment Pain Level (on 0 to 10) scale:   0  / 10     ASSESSMENT  Assessment/Changes in Function:     Patient tolerated progression of program well.  Educated patient he may add light weight which is consistent with PT exercises as part of HEP, patient acknowledged understanding. []  See Progress Note/Recertification   Patient will continue to benefit from skilled PT services to modify and progress therapeutic interventions, address functional mobility deficits, address ROM deficits, address strength deficits, analyze and address soft tissue restrictions, analyze and cue movement patterns, analyze and modify body mechanics/ergonomics and assess and modify postural abnormalities to attain remaining goals.    Progress toward goals / Updated goals:    Progressing with LTG#4 per performance in session     PLAN  [x]  Upgrade activities as tolerated yes Continue plan of care   []  Discharge due to :    []  Other:      Therapist: Viridiana Ring, PT    Date: 2/17/2020 Time: 3:05 PM     Future Appointments   Date Time Provider Ayaka Farnsworth   2/20/2020  2:30 PM Morgan Eisenberg, PT [facility]   2/24/2020  2:00 PM Morgan Eisenberg, PT [facility]   2/27/2020  2:30 PM Morgan Eisenberg, PT [facility]

## 2020-02-20 ENCOUNTER — HOSPITAL ENCOUNTER (OUTPATIENT)
Dept: PHYSICAL THERAPY | Age: 45
Discharge: HOME | End: 2020-02-20
Payer: MEDICARE

## 2020-02-20 PROCEDURE — 97140 MANUAL THERAPY 1/> REGIONS: CPT

## 2020-02-20 PROCEDURE — 97110 THERAPEUTIC EXERCISES: CPT

## 2020-02-20 NOTE — PROGRESS NOTES
PHYSICAL THERAPY - DAILY TREATMENT NOTE    Patient Name: Stiven Gustafson        Date: 2020  : 1975   yes Patient  Verified  Visit #:   7   of   10  Insurance: Payor: [address] [place] [address] / Plan: [facility] MEDICARE COMPLETE / Product Type: Managed Care Medicare /      In time: 230 Out time: 310   Total Treatment Time: 40     Medicare/BCBS Time Tracking (below)   Total Timed Codes (min):  40 1:1 Treatment Time:  36     TREATMENT AREA =  Left shoulder pain [M25.512]    SUBJECTIVE  Pain Level (on 0 to 10 scale):  0  / 10   Medication Changes/New allergies or changes in medical history, any new surgeries or procedures?    no  If yes, update Summary List   Subjective Functional Status/Changes:  []  No changes reported     Patient reports his shoulder is doing well, denies complications since his LV. OBJECTIVE    30 min Therapeutic Exercise:  [x]  See flow sheet   Rationale:      increase ROM and increase strength to improve the patients ability to perform reaching activities. 10 min Manual Therapy: STM to L posterior RC, L rhythmic stabilization at 90 degrees flexion 3x20   Rationale:      decrease pain, increase ROM, increase tissue extensibility and decrease trigger points to improve patient's ability to perform carrying activities. Billed With/As:   [x] TE   [] TA   [] Neuro   [] Self Care Patient Education: [x] Review HEP    [] Progressed/Changed HEP based on:   [] positioning   [] body mechanics   [] transfers   [] heat/ice application    [] other:      Other Objective/Functional Measures:    1:1 TE 26'  Good tolerance to current strengthening program, able to maintain proper form   Post Treatment Pain Level (on 0 to 10) scale:   0  / 10     ASSESSMENT  Assessment/Changes in Function:     Patient denied changes in symptoms post session.  Plan to transition toward DC within the next 2-3 visits with emphasis on independent performance of program.      []  See Progress Note/Recertification   Patient will continue to benefit from skilled PT services to modify and progress therapeutic interventions, address functional mobility deficits, address ROM deficits, address strength deficits, analyze and address soft tissue restrictions, analyze and cue movement patterns, analyze and modify body mechanics/ergonomics and assess and modify postural abnormalities to attain remaining goals.    Progress toward goals / Updated goals:    Progressing with LTG#2     PLAN  [x]  Upgrade activities as tolerated yes Continue plan of care   []  Discharge due to :    []  Other:      Therapist: Mable Rehman, PT    Date: 2/20/2020 Time: 3:27 PM     Future Appointments   Date Time Provider Ayaka Farnsworth   2/24/2020  2:00 PM Estuardo Patton [facility]   2/27/2020  2:30 PM Claudia Davis, PT [facility]   2/28/2020 11:00 AM Jyoti Pepper, PT [facility]

## 2020-02-24 ENCOUNTER — HOSPITAL ENCOUNTER (OUTPATIENT)
Dept: PHYSICAL THERAPY | Age: 45
Discharge: HOME | End: 2020-02-24
Payer: MEDICARE

## 2020-02-24 PROCEDURE — 97110 THERAPEUTIC EXERCISES: CPT

## 2020-02-24 PROCEDURE — 97140 MANUAL THERAPY 1/> REGIONS: CPT

## 2020-02-24 NOTE — PROGRESS NOTES
PHYSICAL THERAPY - DAILY TREATMENT NOTE    Patient Name: Belén Gayle        Date: 2020  : 1975   yes Patient  Verified  Visit #:   8      10  Insurance: Payor: [address] [place] [address] / Plan: [address] / Product Type: Managed Care Medicare /      In time: 200 Out time: 240   Total Treatment Time: 40     Medicare/BCBS Time Tracking (below)   Total Timed Codes (min):  40 1:1 Treatment Time:  25     TREATMENT AREA =  Left shoulder pain [M25.512]    SUBJECTIVE  Pain Level (on 0 to 10 scale):  0  / 10   Medication Changes/New allergies or changes in medical history, any new surgeries or procedures?    no  If yes, update Summary List   Subjective Functional Status/Changes:  []  No changes reported     Patient reports his shoulder is doing well and denies any pain or discomfort at the start of his session. OBJECTIVE    30 min Therapeutic Exercise:  [x]  See flow sheet   Rationale:      increase ROM and increase strength to improve the patients ability to perform reaching activities. 10 min Manual Therapy: STM to L posterior RC, L rhythmic stabilization at 90 degrees flexion 3x20   Rationale:      decrease pain, increase ROM, increase tissue extensibility and decrease trigger points to improve patient's ability to perform carrying activities.       Billed With/As:   [x] TE   [] TA   [] Neuro   [] Self Care Patient Education: [x] Review HEP    [] Progressed/Changed HEP based on:   [] positioning   [] body mechanics   [] transfers   [] heat/ice application    [] other:      Other Objective/Functional Measures:    1:1 TE 15'  Minimal tenderness noted during MT treatment  Patient required assistance from therapist performing supine to prone transfer on plinth as patient nearly rolled off of plinth, therapist was able to assist patient support to maintain on table   Post Treatment Pain Level (on 0 to 10) scale:   0  / 10     ASSESSMENT  Assessment/Changes in Function: Patient denied changes in symptoms post session. Patient will be re-assessed at . [address] 144 for likely DC to HEP. []  See Progress Note/Recertification   Patient will continue to benefit from skilled PT services to modify and progress therapeutic interventions, address functional mobility deficits, address ROM deficits, address strength deficits, analyze and address soft tissue restrictions, analyze and cue movement patterns, analyze and modify body mechanics/ergonomics and assess and modify postural abnormalities to attain remaining goals.    Progress toward goals / Updated goals:    Re-assess objective measurements at [place]     PLAN  [x]  Upgrade activities as tolerated yes Continue plan of care   []  Discharge due to :    []  Other:      Therapist: Mable Rehman, PT    Date: 2/24/2020 Time: 3:55 PM     Future Appointments   Date Time Provider Ayaka Farnsworth   2/27/2020  2:30 PM Claudia Davis, PT [facility]   2/28/2020 11:00 AM Bert Davis, PT [facility]

## 2020-02-27 ENCOUNTER — HOSPITAL ENCOUNTER (OUTPATIENT)
Dept: PHYSICAL THERAPY | Age: 45
Discharge: HOME | End: 2020-02-27
Payer: MEDICARE

## 2020-02-27 PROCEDURE — 97140 MANUAL THERAPY 1/> REGIONS: CPT

## 2020-02-27 PROCEDURE — 97110 THERAPEUTIC EXERCISES: CPT

## 2020-02-27 NOTE — PROGRESS NOTES
PHYSICAL THERAPY - DAILY TREATMENT NOTE    Patient Name: Papito White        Date: 2020  : 1975   yes Patient  Verified  Visit #:      10  Insurance: Payor: Ruchi Boswell / Plan: [facility] MEDICARE COMPLETE / Product Type: Managed Care Medicare /      In time: 230 Out time: 328   Total Treatment Time: 58     Medicare/BCBS Time Tracking (below)   Total Timed Codes (min):  58 1:1 Treatment Time:  54     TREATMENT AREA =  Left shoulder pain [M25.512]    SUBJECTIVE  Pain Level (on 0 to 10 scale):  0  / 10   Medication Changes/New allergies or changes in medical history, any new surgeries or procedures?    no  If yes, update Summary List   Subjective Functional Status/Changes:  []  No changes reported     Patient reports a 75% improvement in symptoms since the start of PT. Patient rates her pain at worst as a 0/10 over the past 1-2 weeks. Patient reports continued compliance with his home exercises. OBJECTIVE    48 min Therapeutic Exercise:  [x]  See flow sheet   Rationale:      increase ROM and increase strength to improve the patients ability to perform reaching activties. 10 min Manual Therapy: STM to L posterior RC, L rhythmic stabilization at 90 degrees flexion 3x20   Rationale:      decrease pain, increase ROM, increase tissue extensibility and decrease trigger points to improve patient's ability to perform carrying activities.     Billed With/As:   [x] TE   [] TA   [] Neuro   [] Self Care Patient Education: [x] Review HEP    [] Progressed/Changed HEP based on:   [] positioning   [] body mechanics   [] transfers   [] heat/ice application    [] other:      Other Objective/Functional Measures:    1:1 TE 40'  See DC note     Post Treatment Pain Level (on 0 to 10) scale:   0  / 10     ASSESSMENT  Assessment/Changes in Function:     See DC note           Progress toward goals / Updated goals:    See DC note     PLAN  []  Upgrade activities as tolerated no Continue plan of care   [x]  Discharge due to : Met or progressing toward goals   []  Other:      Therapist: Sarah Jernigan, PT    Date: 2/27/2020 Time: 3:34 PM     Future Appointments   Date Time Provider Ayaka Farnsworth   2/28/2020 11:00 AM Carmen Moser, PT [facility]

## 2020-02-27 NOTE — PROGRESS NOTES
[address] PHYSICAL THERAPY  [address] [place] [address] [address], [address],Sonja Drummond, [address] [facility] - Phone: [phone]  Fax: [phone]  Shan Huston [facility] PHYSICAL THERAPY          Patient Name: Ivanna Artis : 1975   Treatment/Medical Diagnosis: Left shoulder pain [M25.512]   Onset Date: Chronic    Referral Source: Shree Paredes MD Start of Care [facility]): 20   Prior Hospitalization: See Medical History Provider #: [id number]   Prior Level of Function: Decreased pain and improved mobility and strength LUE for functional overhead reaching activities/ADLs   Comorbidities: 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: 9 Missed Visits: 0     Goal/Measure of Progress Goal Met? 1. Pt will demonstrate an increased FOTO score to 74/100 in order to improve function   Status at last Eval: 59/100 Current Status: 61/100 progressing   2. Pt will demonstrate increased left GH ER/IR strength to 4+/5 in order to improve GH stabilization for functional ADLs   Status at last Eval: Left [address] joint: flexion 3+ abd 3+ ER 4- IR 4 Current Status: ER 3+/5, IR 5/5 progressing   3. Pt will demonstrate increased left Lower trap strength to > 3+/5 in order to improve scapular stabilization for ADLs   Status at last Eval: Lower trap 2+/5 Current Status: Lower trap 3/5 progressing     Key Functional Changes/Progress: Patient reports a 75% improvement in symptoms since the start of therapy. Patient feels like his ROM has improved allowing for better OH mobility and reduced pain during these activities. Patient rates his pain at worst over the past 1-2 weeks as a 0/10. Patient's AROM is as follows: flex 120, abd 85 and FIR T5. Patient does continue to present with a +shoulder hike indicating likely RC tear, however he denies pain during testing.  Patient does demonstrate independence with his current exercise program and reports consistent performance outside of therapy. At this time, patient is appropriate to transition to DC. G-Codes (GP): na  Assessments/Recommendations: Discontinue therapy. Progressing towards or have reached established goals. If you have any questions/comments please contact us directly at [phone]. Thank you for allowing us to assist in the care of your patient. Therapist Signature:  Jessica Salas PT Date: 2/27/20   Reporting Period: 1/31/20-2/27/20 Time: 3:07 PM

## 2020-02-28 ENCOUNTER — HOSPITAL ENCOUNTER (OUTPATIENT)
Dept: PHYSICAL THERAPY | Age: 45
Discharge: HOME | End: 2020-02-28
Payer: MEDICARE

## 2020-02-28 PROCEDURE — 97162 PT EVAL MOD COMPLEX 30 MIN: CPT | Performed by: PHYSICAL THERAPIST

## 2020-03-02 NOTE — PROGRESS NOTES
[facility] PHYSICAL THERAPY - DAILY TREATMENT NOTE    Patient Name: Hernandez Tong        Date: 3/2/2020  : 1975   yes Patient  Verified  Visit #:     Insurance: Payor: [address] [place] [address] / Plan: [address] / Product Type: Managed Care Medicare /      In time: 1100 Out time: 1140   Total Treatment Time: 40     Medicare/[facility] Time Tracking (below)   Total Timed Codes (min):  0 1:1 Treatment Time:  0     TREATMENT AREA =  Pain in left knee [M25.562]    SUBJECTIVE  Pain Level (on 0 to 10 scale):  1  / 10   Medication Changes/New allergies or changes in medical history, any new surgeries or procedures?    no  If yes, update Summary List   Subjective Functional Status/Changes:  []  No changes reported     See ie          OBJECTIVE        Billed With/As:   [] TE   [] TA   [] Neuro   [] Self Care Patient Education: [x] Review HEP    [] Progressed/Changed HEP based on:   [] positioning   [] body mechanics   [] transfers   [] heat/ice application    [] other:      Other Objective/Functional Measures:    See ie     Post Treatment Pain Level (on 0 to 10) scale:   1  / 10     ASSESSMENT  Assessment/Changes in Function:     See ie     []  See Progress Note/Recertification   Patient will continue to benefit from skilled PT services to modify and progress therapeutic interventions, address functional mobility deficits, address ROM deficits, address strength deficits, analyze and address soft tissue restrictions, analyze and cue movement patterns, analyze and modify body mechanics/ergonomics, assess and modify postural abnormalities, address imbalance/dizziness and instruct in home and community integration to attain remaining goals.    Progress toward goals / Updated goals:    See ie     PLAN  []  Upgrade activities as tolerated yes Continue plan of care   []  Discharge due to :    []  Other:      Therapist: Baylee Nath PT    Date: 20 Time: 1140 AM     Future Appointments   Date Time Provider Ayaka Farnsworth   3/4/2020 12:30 PM Jemal Arrieta [facility]   3/11/2020 11:30 AM Nat Hu, PT [facility]   3/13/2020  9:00 AM Nat Hu, PT [facility]   3/17/2020  9:30 AM Jemal Arrieta [facility]   3/19/2020 10:30 AM Jemal Arrieta [facility]   3/24/2020 10:30 AM Jemal Arrieta [facility]   3/26/2020 10:30 AM Kitty Obando, VICTORINO [facility]

## 2020-03-02 NOTE — PROGRESS NOTES
.MELISSA [address] PHYSICAL THERAPY   [facility] [address], [address],[facility], 70 [facility] - Phone: [phone]  Fax: [phone] / 70[address] 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   ===========================================================================================  Assessment / key information:  40 M arrives to clinic with dx of L knee pain that is chronic in origin. 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 Pt is on baclofen for his right UE/LE. He dons a right AFO and uses a SPC in his L UE. He is currently getting rejuvinix for his left knee which has helped. He reported falling this morning in parking lot while attempting to step on curb with his R leg. Denies hitting his head or any post-concussive sx. He is alert and oriented x4. He states that his L knee pain is localized to lateral aspect of joint line progressing with wb (7/10 max) and alleviated with rest.. No c/o radicular s/s or numbness in the left LE.  Objective findings: pt stands in significant hip ir/add, equinovalgus and rearfoot eversion, his knee arom -7-119, significant tenderness along LCL and IT band insertion (+corresponding tfl/rf/gm tightness), strength hip abd 4/5, ext 3/5, knee ext 4/5, unable to perform any wb activities with neutral alignment. Pt goal at this time is to attempt pain reduction and learn exercises for self management of sx. Discussed at length with pt that due to his pmh and chronic nature of sx pain free is not a realistic or functional goal. Pt agreed. He is amiable to attempt strengthening program to help improve current status.    ===========================================================================================  Eval Complexity: History HIGH Complexity :3+ comorbidities / personal factors will impact the outcome/ POC ;  Examination  HIGH Complexity : 4+ Standardized tests and measures addressing body structure, function, activity limitation and / or participation in recreation ; Presentation HIGH Complexity : Unstable and unpredictable characteristics ; Decision Making MEDIUM Complexity : FOTO score of 26-74; Overall Complexity MEDIUM  Problem List: pain affecting function, decrease ROM, decrease strength, impaired gait/ balance, decrease ADL/ functional abilitiies, decrease activity tolerance, decrease flexibility/ joint mobility and decrease transfer abilities   Treatment Plan may include any combination of the following: Therapeutic exercise, Therapeutic activities, Neuromuscular re-education, Physical agent/modality, Gait/balance training, Manual therapy, Patient education, Self Care training, Functional mobility training, Home safety training and Stair training  Patient / Family readiness to learn indicated by: asking questions, trying to perform skills and interest  Persons(s) to be included in education: patient (P)  Barriers to Learning/Limitations: yes;  physical  Measures taken, if barriers to learnin:1 session with therapist   Patient Goal (s): Decrease pain, self manage sx    Patient self reported health status: good  Rehabilitation Potential: fair   Short Term Goals: To be accomplished in  2  weeks:  1.  Pt will be compliant with hep  2. Pt will be able to perform 2x10 side lying clams to improve knee stability during standing  3. Pt will be able to perform >/=10\" bridges to improve knee stability during standing    Long Term Goals: To be accomplished in  4  weeks:  1. Pt will increase FOTO by 10 points in order to show functional improvement  2. Pt will note daily max pain </=5/10 in order to increase participation in adls  3. Pt will be I with advanced hep in order to prepare for dc  Frequency / Duration:   Patient to be seen  2-3  times per week for 4  weeks:  Patient / Caregiver education and instruction: self care and activity modification  Therapist Signature: Estephanie Baez PT Date: 3/34/89   Certification Period: 2/28/2020-5/25/20 Time: 7:34 AM   ===========================================================================================  I certify that the above Physical Therapy Services are being furnished while the patient is under my care. I agree with the treatment plan and certify that this therapy is necessary. Physician Signature:        Date:       Time:     Please sign and return to [facility] [place]. or you may fax the signed copy to [phone]. Thank you.

## 2020-03-04 ENCOUNTER — HOSPITAL ENCOUNTER (OUTPATIENT)
Dept: PHYSICAL THERAPY | Age: 45
Discharge: HOME | End: 2020-03-04
Payer: MEDICARE

## 2020-03-04 PROCEDURE — 97110 THERAPEUTIC EXERCISES: CPT

## 2020-03-04 PROCEDURE — 97140 MANUAL THERAPY 1/> REGIONS: CPT

## 2020-03-04 NOTE — PROGRESS NOTES
PHYSICAL THERAPY - DAILY TREATMENT NOTE    Patient Name: Wai Hughes        Date: 3/4/2020  : 1975   yes Patient  Verified  Visit #:      12  Insurance: Payor: Gonzalo Kocher / Plan: [employer] MEDICARE COMPLETE / Product Type: Managed Care Medicare /      In time: 7391 Out time: 105   Total Treatment Time: 40     Medicare/BCBS Time Tracking (below)   Total Timed Codes (min):  40 1:1 Treatment Time:  40     TREATMENT AREA =  Pain in left knee [M25.562]    SUBJECTIVE  Pain Level (on 0 to 10 scale):  1-2  / 10   Medication Changes/New allergies or changes in medical history, any new surgeries or procedures?    no  If yes, update Summary List   Subjective Functional Status/Changes:  []  No changes reported     Patient reports pain increases when he goes from sitting to standing position after sitting for a prolonged period of time. Reports no HEP at this time. OBJECTIVE  Modalities Rationale:     decrease inflammation and decrease pain to improve patient's ability to perform functional mobility activities with decrease c/o symptoms.    min [] Estim, type/location:                                      []  att     []  unatt     []  w/US     []  w/ice    []  w/heat    min []  Mechanical Traction: type/lbs                   []  pro   []  sup   []  int   []  cont    []  before manual    []  after manual    min []  Ultrasound, settings/location:      min []  Iontophoresis w/ dexamethasone, location:                                               []  take home patch       []  in clinic    min []  Ice     []  Heat    location/position:     min []  Vasopneumatic Device, press/temp:     min []  Other:    [x] Skin assessment post-treatment (if applicable):    [x]  intact    []  redness- no adverse reaction     []redness  adverse reaction:        25 min Therapeutic Exercise:  [x]  See flow sheet   Rationale:      increase ROM, increase strength, improve coordination and improve balance to improve the patients ability to perform functional mobility activities with decrease c/o symptoms. 15 min Manual Therapy: CFM lateral knee along LCL and ITband (lower portion), HS, ITband and piriformis stretches   Rationale:      decrease pain, increase ROM and increase tissue extensibility to improve patient's ability to perform functional mobility activities with decrease c/o symptoms. Billed With/As:   [x] TE   [] TA   [] Neuro   [] Self Care Patient Education: [x] Review HEP    [] Progressed/Changed HEP based on:   [] positioning   [] body mechanics   [] transfers   [] heat/ice application    [] other:      Other Objective/Functional Measures:    No change in functional measurements today. Post Treatment Pain Level (on 0 to 10) scale:   0  / 10     ASSESSMENT  Assessment/Changes in Function:     Overall good tolerance to all therapeutic interventions for first treatment session     []  See Progress Note/Recertification   Patient will continue to benefit from skilled PT services to modify and progress therapeutic interventions, address functional mobility deficits, address ROM deficits, address strength deficits, analyze and address soft tissue restrictions and analyze and cue movement patterns to attain remaining goals. Progress toward goals / Updated goals: · Short Term Goals: To be accomplished in  2  weeks:  1. Pt will be compliant with hep  2. Pt will be able to perform 2x10 side lying clams to improve knee stability during standing  3. Pt will be able to perform >/=10\" bridges to improve knee stability during standing   · Long Term Goals: To be accomplished in  4  weeks:  1. Pt will increase FOTO by 10 points in order to show functional improvement  2. Pt will note daily max pain </=5/10 in order to increase participation in adls  3. Pt will be I with advanced hep in order to prepare for dc    No change toward goals today.        PLAN  []  Upgrade activities as tolerated yes Continue plan of care   []  Discharge due to :    []  Other:      Therapist: Christian Lou PTA    Date: 3/4/2020 Time: 6:09 AM     Future Appointments   Date Time Provider Ayaka Farnsworth   [facility]2020 [facility]   3/11/2020 11:30 AM Jhon Sharp, PT [facility]   3/13/2020  9:00 AM Jhon Sharp, PT [facility]   3/17/2020  9:30 AM José Antonio Jackson, PTA [facility]   3/19/2020 10:30 AM José Antonio Jackson, PTA [facility]   3/24/2020 10:30 AM José Antonio Jackson, PTA [facility]   3/26/2020 10:30 AM Manuel Blancas, PTA [facility]

## 2020-03-10 ENCOUNTER — HOSPITAL ENCOUNTER (OUTPATIENT)
Dept: PHYSICAL THERAPY | Age: 45
Discharge: HOME | End: 2020-03-10
Payer: MEDICARE

## 2020-03-10 PROCEDURE — 97140 MANUAL THERAPY 1/> REGIONS: CPT

## 2020-03-10 NOTE — PROGRESS NOTES
PHYSICAL THERAPY - DAILY TREATMENT NOTE    Patient Name: Wai Hughes        Date: 3/10/2020  : 1975   yes Patient  Verified  Visit #:   3   of   12  Insurance: Payor: Gonzalo Kocher / Plan: [facility] MEDICARE COMPLETE / Product Type: Managed Care Medicare /      In time: 759 Out time: 950   Total Treatment Time: 35     Medicare/BCBS Time Tracking (below)   Total Timed Codes (min):  35 1:1 Treatment Time:  15     TREATMENT AREA =  Pain in left knee [M25.562]    SUBJECTIVE  Pain Level (on 0 to 10 scale):  1  / 10   Medication Changes/New allergies or changes in medical history, any new surgeries or procedures?    no  If yes, update Summary List   Subjective Functional Status/Changes:  []  No changes reported     Ellis fine after last session. No new c/o         OBJECTIVE    20 min Therapeutic Exercise:  [x]  See flow sheet   Rationale:      increase ROM, increase strength, improve coordination and improve balance to improve the patients ability to perform functional mobility activities with decrease c/o symptoms. 15 min Manual Therapy: CFM lateral knee along LCL and ITband (lower portion), HS, ITband and piriformis stretches   Rationale:      decrease pain, increase ROM and increase tissue extensibility to improve patient's ability to perform functional mobility activities with decrease c/o symptoms. Billed With/As:   [x] TE   [] TA   [] Neuro   [] Self Care Patient Education: [x] Review HEP    [] Progressed/Changed HEP based on:   [] positioning   [] body mechanics   [] transfers   [] heat/ice application    [] other:      Other Objective/Functional Measures:    No change in knee mobility: no significant restrictions noted. Post Treatment Pain Level (on 0 to 10) scale:   1  / 10     ASSESSMENT  Assessment/Changes in Function:   Patient arrived 15 minutes late for treatment session but was able to be seen.   Patient advised that he needs to be on time for his sessions in order to have timely sessions. Reports less knee pain with full weight bearing. []  See Progress Note/Recertification   Patient will continue to benefit from skilled PT services to modify and progress therapeutic interventions, address functional mobility deficits, address ROM deficits, address strength deficits, analyze and address soft tissue restrictions and analyze and cue movement patterns to attain remaining goals. Progress toward goals / Updated goals: · Short Term Goals: To be accomplished in  2  weeks:  1. Pt will be compliant with hep  2. Pt will be able to perform 2x10 side lying clams to improve knee stability during standing  3. Pt will be able to perform >/=10\" bridges to improve knee stability during standing   · Long Term Goals: To be accomplished in  4  weeks:  1. Pt will increase FOTO by 10 points in order to show functional improvement  2. Pt will note daily max pain </=5/10 in order to increase participation in adls  3.  Pt will be I with advanced hep in order to prepare for dc       PLAN  []  Upgrade activities as tolerated yes Continue plan of care   []  Discharge due to :    []  Other:      Therapist: Christian Lou PTA    Date: 3/10/2020 Time: 5:55 AM     Future Appointments   Date Time Provider Ayaka Farnsworth   3/10/2020  9:00 AM Brandon Sparks [facility]   3/12/2020  8:30 AM Jhon Sharp, PT [facility]   3/17/2020  9:30 AM José Antonio Jackson PTA [facility]   3/19/2020 10:30 AM José Antonio Jackson PTA [facility]   3/24/2020 10:30 AM José Antonio Jackson PTA [facility]   3/26/2020 10:30 AM Manuel Blancas PTA [facility]

## 2020-03-11 ENCOUNTER — APPOINTMENT (OUTPATIENT)
Dept: PHYSICAL THERAPY | Age: 45
End: 2020-03-11
Payer: MEDICARE

## 2020-03-12 ENCOUNTER — HOSPITAL ENCOUNTER (OUTPATIENT)
Dept: PHYSICAL THERAPY | Age: 45
Discharge: HOME | End: 2020-03-12
Payer: MEDICARE

## 2020-03-12 PROCEDURE — 97110 THERAPEUTIC EXERCISES: CPT | Performed by: PHYSICAL THERAPIST

## 2020-03-12 PROCEDURE — 97140 MANUAL THERAPY 1/> REGIONS: CPT | Performed by: PHYSICAL THERAPIST

## 2020-03-12 NOTE — PROGRESS NOTES
[facility] PHYSICAL THERAPY - DAILY TREATMENT NOTE    Patient Name: Wai Hughes        Date: 3/12/2020  : 1975   yes Patient  Verified  Visit #:     Insurance: Payor: [address] [place] [address] / Plan: [facility] MEDICARE COMPLETE / Product Type: Managed Care Medicare /      In time: 908 Out time: 253   Total Treatment Time: 37     Medicare/BCBS Time Tracking (below)   Total Timed Codes (min):  37 1:1 Treatment Time:  37     TREATMENT AREA =  Pain in left knee [M25.562]    SUBJECTIVE  Pain Level (on 0 to 10 scale):  2  / 10   Medication Changes/New allergies or changes in medical history, any new surgeries or procedures?    no  If yes, update Summary List   Subjective Functional Status/Changes:  []  No changes reported     I was sore on the outside part of my knee after last time.  I am not sure why           OBJECTIVE      25 min Therapeutic Exercise:  [x]  See flow sheet   Rationale:      increase ROM and increase strength to improve the patients ability to complete adls     12 min Manual Therapy: Dtm/stretch tfl/rf/gm, cfm lateral aspect of knee    Rationale:      decrease pain, increase ROM, increase tissue extensibility and decrease trigger points to improve patient's ability to complete adls       Billed With/As:   [] TE   [] TA   [] Neuro   [] Self Care Patient Education: [x] Review HEP    [] Progressed/Changed HEP based on:   [] positioning   [] body mechanics   [] transfers   [] heat/ice application    [] other:      Other Objective/Functional Measures:    Difficulty performing side lying hip abduction with appropriate form despite cues   ttp along tfl and post fibers of gm      Post Treatment Pain Level (on 0 to 10) scale:   1  / 10     ASSESSMENT  Assessment/Changes in Function:     No increase in pain following session      []  See Progress Note/Recertification   Patient will continue to benefit from skilled PT services to modify and progress therapeutic interventions, address functional mobility deficits, address ROM deficits, address strength deficits, analyze and address soft tissue restrictions, analyze and cue movement patterns, analyze and modify body mechanics/ergonomics, assess and modify postural abnormalities, address imbalance/dizziness and instruct in home and community integration to attain remaining goals. Progress toward goals / Updated goals:     Will increase glute bridges to 10\" in order to address stg      PLAN  []  Upgrade activities as tolerated yes Continue plan of care   []  Discharge due to :    []  Other:      Therapist: Catherine Malik PT    Date: 3/12/2020 Time: 12:27 PM     Future Appointments   Date Time Provider Ayaka Farnsworth   3/17/2020  9:30 AM Shawn Grossman [facility]   3/19/2020 10:30 AM Shawn Grossman [facility]   3/24/2020 10:30 AM Shawn Grossman [facility]   3/26/2020 10:30 AM Shawn Grossman [facility]

## 2020-03-13 ENCOUNTER — APPOINTMENT (OUTPATIENT)
Dept: PHYSICAL THERAPY | Age: 45
End: 2020-03-13
Payer: MEDICARE

## 2020-03-17 ENCOUNTER — HOSPITAL ENCOUNTER (OUTPATIENT)
Dept: PHYSICAL THERAPY | Age: 45
Discharge: HOME | End: 2020-03-17
Payer: MEDICARE

## 2020-03-17 PROCEDURE — 97110 THERAPEUTIC EXERCISES: CPT

## 2020-03-17 PROCEDURE — 97140 MANUAL THERAPY 1/> REGIONS: CPT

## 2020-03-17 NOTE — PROGRESS NOTES
PHYSICAL THERAPY - DAILY TREATMENT NOTE    Patient Name: Sherita Davison        Date: 3/17/2020  : 1975   yes Patient  Verified  Visit #:      of   12  Insurance: Payor: Ashley Chung / Plan: [facility] MEDICARE COMPLETE / Product Type: Managed Care Medicare /      In time: 384 Out time: 1005   Total Treatment Time: 35     Medicare/BCBS Time Tracking (below)   Total Timed Codes (min):  35 1:1 Treatment Time:  35     TREATMENT AREA =  Pain in left knee [M25.562]    SUBJECTIVE  Pain Level (on 0 to 10 scale):  2-3  / 10 knee   Medication Changes/New allergies or changes in medical history, any new surgeries or procedures?    no  If yes, update Summary List   Subjective Functional Status/Changes:  []  No changes reported     My pain was higher after Makenzie worked on the muscle of my hip. And it's still dain painful today. It didn't seem to change anything. I didn't feel any pain though when I laid down. OBJECTIVE    20 min Therapeutic Exercise:  [x]  See flow sheet   Rationale:      increase ROM, increase strength, improve coordination and improve balance to improve the patients ability to perform functional mobility activities with decrease c/o symptoms.        15 min Manual Therapy: CFM lateral knee along LCL and ITband (lower portion), HS, ITband and piriformis stretches   Rationale:      decrease pain, increase ROM and increase tissue extensibility to improve patient's ability to perform functional mobility activities with decrease c/o symptoms.        Billed With/As:   [x] TE   [] TA   [] Neuro   [] Self Care Patient Education: [x] Review HEP    [] Progressed/Changed HEP based on:   [] positioning   [] body mechanics   [] transfers   [] heat/ice application    [] other:      Other Objective/Functional Measures:    No change in functional measurements noted today. TTP at TFL and lateral portion of knee.   Patient continues to demonstrate compensatory techniques while performing hip abd and clams. Post Treatment Pain Level (on 0 to 10) scale:   0  / 10     ASSESSMENT  Assessment/Changes in Function:     Patient performed 10 x 10seconds bridges, clams and BKFO     []  See Progress Note/Recertification   Patient will continue to benefit from skilled PT services to modify and progress therapeutic interventions, address functional mobility deficits, address ROM deficits, address strength deficits, analyze and address soft tissue restrictions and analyze and cue movement patterns to attain remaining goals. Progress toward goals / Updated goals: · Short Term Goals: To be accomplished in  2  weeks:  1. Pt will be compliant with hep  2. Pt will be able to perform 2x10 side lying clams to improve knee stability during standing  3. Pt will be able to perform >/=10\" bridges to improve knee stability during standing   · Long Term Goals: To be accomplished in  4  weeks:  1. Pt will increase FOTO by 10 points in order to show functional improvement  2. Pt will note daily max pain </=5/10 in order to increase participation in adls  3.  Pt will be I with advanced hep in order to prepare for dc       PLAN  []  Upgrade activities as tolerated yes Continue plan of care   []  Discharge due to :    []  Other:      Therapist: Cruz Neely PTA    Date: 3/17/2020 Time: 5:54 AM     Future Appointments   Date Time Provider Ayaka Farnsworth   3/17/2020  9:30 AM Lilliam Sanchez PTA [facility]   3/19/2020 10:30 AM Lilliam Sanchez PTA [facility]   3/20/2020  9:30 AM Lilliam Sanchez PTA [facility]   3/24/2020 10:30 AM Lilliam Sanchez PTA [facility]   3/26/2020 10:30 AM Lilliam Sanchez PTA [facility]

## 2020-03-19 ENCOUNTER — APPOINTMENT (OUTPATIENT)
Dept: PHYSICAL THERAPY | Age: 45
End: 2020-03-19
Payer: MEDICARE

## 2020-03-20 ENCOUNTER — HOSPITAL ENCOUNTER (OUTPATIENT)
Dept: PHYSICAL THERAPY | Age: 45
Discharge: HOME | End: 2020-03-20
Payer: MEDICARE

## 2020-03-20 PROCEDURE — 97110 THERAPEUTIC EXERCISES: CPT

## 2020-03-20 PROCEDURE — 97140 MANUAL THERAPY 1/> REGIONS: CPT

## 2020-03-20 NOTE — PROGRESS NOTES
PHYSICAL THERAPY - DAILY TREATMENT NOTE    Patient Name: Chase Alexander        Date: 3/20/2020  : 1975   yes Patient  Verified  Visit #:      12  Insurance: Payor: Josefina Hammond / Plan: [facility] MEDICARE COMPLETE / Product Type: Managed Care Medicare /      In time: 586 Out time: 1010   Total Treatment Time: 40     Medicare/BCBS Time Tracking (below)   Total Timed Codes (min):  40 1:1 Treatment Time:  40     TREATMENT AREA =  Pain in left knee [M25.562]    SUBJECTIVE  Pain Level (on 0 to 10 scale):  2-3  / 10   Medication Changes/New allergies or changes in medical history, any new surgeries or procedures?    no  If yes, update Summary List   Subjective Functional Status/Changes:  []  No changes reported     My shin was really bothering and my muscles got really sore yesterday. OBJECTIVE    30 min Therapeutic Exercise:  [x]  See flow sheet   Rationale:      increase ROM, increase strength, improve coordination and improve balance to improve the patients ability to perform functional mobility activities with decrease c/o symptoms.        10 min Manual Therapy: CFM lateral knee along LCL and ITband (lower portion), HS, ITband and piriformis stretches   Rationale:      decrease pain, increase ROM and increase tissue extensibility to improve patient's ability to perform functional mobility activities with decrease c/o symptoms.         Billed With/As:   [x] TE   [] TA   [] Neuro   [] Self Care Patient Education: [x] Review HEP    [] Progressed/Changed HEP based on:   [] positioning   [] body mechanics   [] transfers   [] heat/ice application    [] other:      Other Objective/Functional Measures:    Decrease full knee extension when performing extension mob.      Post Treatment Pain Level (on 0 to 10) scale:   0  / 10     ASSESSMENT  Assessment/Changes in Function:     Add leg press 100# 2 x 10.     []  See Progress Note/Recertification   Patient will continue to benefit from skilled PT services to modify and progress therapeutic interventions, address functional mobility deficits, address ROM deficits, address strength deficits, analyze and address soft tissue restrictions and analyze and cue movement patterns to attain remaining goals. Progress toward goals / Updated goals: · Short Term Goals: To be accomplished in  2  weeks:  1. Pt will be compliant with hep  2. Pt will be able to perform 2x10 side lying clams to improve knee stability during standing: progressing well 3/20/2020  3. Pt will be able to perform >/=10\" bridges to improve knee stability during standing: progressing well 3/20/2020    · Long Term Goals: To be accomplished in  4  weeks:  1. Pt will increase FOTO by 10 points in order to show functional improvement  2. Pt will note daily max pain </=5/10 in order to increase participation in adls  3.  Pt will be I with advanced hep in order to prepare for dc       PLAN  []  Upgrade activities as tolerated yes Continue plan of care   []  Discharge due to :    []  Other:      Therapist: Molina Moulton PTA    Date: 3/20/2020 Time: 6:11 AM     Future Appointments   Date Time Provider Ayaak Farnsworth   3/20/2020  9:30 AM Petty Wright [facility]   3/24/2020 10:30 AM Natalya Pal PTA [facility]   3/26/2020 10:30 AM Adelaide Bloom PTA [facility]

## 2020-03-24 ENCOUNTER — APPOINTMENT (OUTPATIENT)
Dept: PHYSICAL THERAPY | Age: 45
End: 2020-03-24
Payer: MEDICARE

## 2020-03-26 ENCOUNTER — APPOINTMENT (OUTPATIENT)
Dept: PHYSICAL THERAPY | Age: 45
End: 2020-03-26
Payer: MEDICARE

## 2020-03-30 NOTE — PROGRESS NOTES
[address] PHYSICAL THERAPY  [address] [place] [address] [address], [address],Sonja Drummond, [address] - Phone: [phone]  Fax: [phone]  Shan Marshall Betzaida Toth [facility] PHYSICAL THERAPY          Patient Name: Christophe Archuleta : 1975   Treatment/Medical Diagnosis: Pain in left knee [M25.562]   Onset Date: chronic    Referral Source: [facility], Not On File, MD Start of Care [facility]): 2020   Prior Hospitalization: See Medical History Provider #: [id number]   Prior Level of Function: Fall risk, ADL with all activities   Comorbidities: 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 1. Pt will increase FOTO by 10 points in order to show functional improvement   Status at last Eval: 58 Current Status: na no   2. Pt will note daily max pain </=5/10 in order to increase participation in adls   Status at last Eval: 7/10 Current Status: na no   3. Pt will be I with advanced hep in order to prepare for dc   Status at last Eval:  Current Status: Emailed HEP for patient progressing     Assessments/Recommendations: Spoke with patient about further PT and decided DC at this time secondary to corvid-19. If you have any questions/comments please contact us directly at [phone]. Thank you for allowing us to assist in the care of your patient.     Therapist Signature: LETI Villegas/ Date: 3/30/2020   Reporting Period: 2020 - 3/20/2020 Time: 10:04 AM

## 2020-10-27 ENCOUNTER — PATIENT OUTREACH (OUTPATIENT)
Dept: CASE MANAGEMENT | Age: 45
End: 2020-10-27

## 2020-10-27 RX ORDER — IBUPROFEN 200 MG
200 TABLET ORAL
COMMUNITY

## 2020-10-27 RX ORDER — BISMUTH SUBSALICYLATE 262 MG
1 TABLET,CHEWABLE ORAL DAILY
COMMUNITY

## 2020-10-27 RX ORDER — DOCUSATE SODIUM 100 MG/1
100 CAPSULE, LIQUID FILLED ORAL DAILY
COMMUNITY

## 2020-10-27 RX ORDER — DEXTROMETHORPHAN HYDROBROMIDE, GUAIFENESIN 5; 100 MG/5ML; MG/5ML
650 LIQUID ORAL EVERY 8 HOURS
COMMUNITY

## 2020-10-27 RX ORDER — CETYL ALC/STEARYL ALC/PG/SLS
CREAM (GRAM) TOPICAL AS NEEDED
COMMUNITY

## 2020-10-27 NOTE — PROGRESS NOTES
Patient was admitted to [facility] on 10/22/20 and discharged on 10/26/20 for traumatic closed nondisplaced fracture of proximal end of left fibula. Outreach made within 2 business days of discharge: Yes    Top Discharge Challenges to be reviewed by the provider   Additional needs identified to be addressed with provider no  none  Discussed COVID-19 related testing which was not done at this time. Test results were not done. Patient informed of results, if available? N/A   Method of communication with provider : chart routing       Advance Care Planning:   Does patient have an Advance Directive:  not on file; will address at a later time    Inpatient Readmission Risk score: 14%  Was this a readmission? no   Patient stated reason for the admission: N/A  Patients top risk factors for readmission: falls  Interventions to address risk factors: suggested patient allow health attendant to assist and discussed potential safety concern with patient using vanity in bathroom for support when walking. Patient stated, \"I'm going to do what I want\". Care Transition Nurse (CTN) contacted the patient by telephone to perform post hospital discharge assessment. Verified name and  with patient as identifiers. Provided introduction to self, and explanation of the CTN role. CTN reviewed discharge instructions, medical action plan and red flags with patient who verbalized understanding. Patient given an opportunity to ask questions and does not have any further questions or concerns at this time. The patient agrees to contact the PCP office for questions related to their healthcare. Medication reconciliation was performed with patient, who verbalizes understanding of administration of home medications. Advised obtaining a 90-day supply of all daily and as-needed medications.    Referral to Pharm D needed: no     Home Health/Outpatient orders at discharge: PT, OT and personal care aide  Home Health company: [facility]  Date of initial visit: 10//27/20    Patient voiced he has not heard from home health. Patient verbalized home attendant is at the home but does not feel like he needs them. Suggested allowing home attendant to assist for a few days and if he feels as if he does not need then contact [employer] to cancel. Patient voiced agreement and understanding. Durable Medical Equipment ordered at discharge: Bedside Commode, Canes/Walkers/Crutches and [address]: [employer]  Durable Medical Equipment received: no    Patient reported he received tub bench but stated, \"it is too big\" and would cause water to go on the tile floor. Patient voiced they are to bring him a new one. Patient also voiced they are to bring a michelle walker today as well. Covid Risk Education  Patient has following risk factors of: no known risk factors. Education provided regarding infection prevention, and signs and symptoms of COVID-19 and when to seek medical attention with patient who verbalized understanding. Discussed exposure protocols and quarantine From CDC: Are you at higher risk for severe illness?  and given an opportunity for questions and concerns. The patient agrees to contact the COVID-19 hotline 587-061-8266 or PCP office for questions related to COVID-19. For more information on steps you can take to protect yourself, see CDC's How to Protect Yourself     Patient/family/caregiver given information for [employer] and agrees to enroll no      Discussed follow-up appointments. If no appointment was previously scheduled, appointment scheduling offered: yes  [facility] follow up appointment(s): No future appointments. Non-[facility] follow up appointment(s): Dr. Alisha Jenkins (ortho) 10/29/20 @ 2:30 PM  Dr. Manny Bunch (otolaryngology) 11/3/20    Plan for follow-up call in 7-10 days based on severity of symptoms and risk factors.   CTN provided contact information for future needs. Goals Addressed                 This Visit's Progress     Attends follow-up appointments as directed. Goal: Patient will attend all appointments scheduled within the next 30 days. Ensure provider appt is scheduled within 7 days post-discharge; 10/27: Will assist patient in scheduling GARY appt. Ortho appt scheduled  Confirm patient attended post-discharge provider appt   Complete post-visit call to confirm attendance and update care needs           Prevent complications post hospitalization. Goal: No admissions post 30 days from discharge of 10/26/20. Review/educate common or potential \"red flags\" of condition worsening  Evaluate adherence to medications and priority barriers to resolve; No barriers noted           Ensure DME in place and used as instructed; 10/27: Per patient      Review the Home Visit or Home Health documentation for coordinating care and goals; 10/27: [facility] 10/27/20    Communicate visits and goals between multiple providers and services    Assess for health risk behaviors and educate patient/caregivers on reducing risk; 10/27: Discussed safety concerns with patient; patient not receptive        Discuss and provide resources for ACP; Will address at a later time   Discuss and evaluate ADL performance. Provide recommendations on energy conservation, particularly related to transition home from an inpatient admission.

## 2020-10-30 ENCOUNTER — VIRTUAL VISIT (OUTPATIENT)
Dept: FAMILY MEDICINE CLINIC | Age: 45
End: 2020-10-30
Payer: MEDICARE

## 2020-10-30 DIAGNOSIS — Z87.81 HISTORY OF FIBULA FRACTURE: ICD-10-CM

## 2020-10-30 DIAGNOSIS — Z09 HOSPITAL DISCHARGE FOLLOW-UP: Primary | ICD-10-CM

## 2020-10-30 DIAGNOSIS — S72.8X2A OTHER CLOSED FRACTURE OF LEFT FEMUR, UNSPECIFIED PORTION OF FEMUR, INITIAL ENCOUNTER (HCC): ICD-10-CM

## 2020-10-30 PROCEDURE — 99442 PR PHYS/QHP TELEPHONE EVALUATION 11-20 MIN: CPT | Performed by: INTERNAL MEDICINE

## 2020-10-30 NOTE — PROGRESS NOTES
Ivanna Artis is a 39 y.o. male evaluated via telephone on 10/30/2020. Consent:  He and/or health care decision maker is aware that that he may receive a bill for this telephone service, depending on his insurance coverage, and has provided verbal consent to proceed: Yes    Documentation:  I communicated with the patient and/or health care decision maker about recent hospitalization. He underwent  Repair of traumatic closed nondisplaced fracture of proximal end of left fibula. Apparently saw Dr. Ovidio Rosales yesterday who plans on doing CT/MRI of lower extremity for concerns for femur fracture as well based on xray. He has been weight bearing with a walker, but now Dr. Ovidio Rosales is saying he should be non-weight bearing. His mother is helping care for him, in addition to his wife whom works. Mr. Burk is a 39y.o. year old male, he is seen today for Transition of Care services following a hospital discharge for fibula fracture and possible femur fracture on 10/26/20. Our office Nurse Navigator performed an outreach to Mr. Anderson Jung on 10/27 (within 2 business days of discharge) to complete medication reconciliation and a telephonic assessment of his condition. Has all dme he needs at home. Details of this discussion including any medical advice provided:   Pursue CT/ as ordered by Dr. Ovidio Rosales. Non-weight bearing until guided by ortho. Fibula repair is doing well. I affirm this is a Patient Initiated Episode with an Established Patient who has not had a related appointment within my department in the past 7 days or scheduled within the next 24 hours.     Total Time: minutes: 11-20 minutes    Note: not billable if this call serves to triage the patient into an appointment for the relevant concern  This service was provided through (Telehealth, Virtual Check In or E-Visit), both the patient at home and the provider at [facility]  and the SHIRLEY Martinez at Katheryn Morley MD

## 2020-11-03 ENCOUNTER — PATIENT OUTREACH (OUTPATIENT)
Dept: CASE MANAGEMENT | Age: 45
End: 2020-11-03

## 2020-11-24 ENCOUNTER — PATIENT OUTREACH (OUTPATIENT)
Dept: CASE MANAGEMENT | Age: 45
End: 2020-11-24

## 2020-11-24 NOTE — PROGRESS NOTES
Transitions of Care Coordination  Follow-Up    Date/Time:  2020 1:26 PM     CTN (Care Transitions Nurse) contacted patient for Transitions of Care Coordination  follow up. Verified 2 patient identifiers (name and ). Spoke to patient. Introduced self/role and reason for call. Patient reported:   Little pain; taking Motrin and Tylenol  WBAT  Femur isn't fractured  PT on hold for now      Pertinent negatives:  Swelling   Discoloration or redness    Patient voiced Dr. Shefali Velázquez feel like he has a detached ligament in ankle. Specialist appointments since last outreach? No     Medications:   New medications since last outreach: no  Does patient need refills on any medications: no  Medication changes since last outreach (dose adjustments or discontinued meds): no     Barriers to care? None at this time      Patient reminded that there are physicians on call 24 hours a day / 7 days a week (M-F 5pm to 8am and from Friday 5pm until Monday 8a for the weekend) should the patient have questions or concerns. No future appointments. Other upcoming appointments:  Dr. Alee Velasco, foot specialist 20    Goals      Attends follow-up appointments as directed. Goal: Patient will attend all appointments scheduled within the next 30 days. Ensure provider appt is scheduled within 7 days post-discharge; 10/27: Will assist patient in scheduling GARY appt. Ortho appt scheduled  Confirm patient attended post-discharge provider appt ; Attended GARY appt on 10/30. Seen by Dr. Shefali Velázquez 10/29  Complete post-visit call to confirm attendance and update care needs; Done           Prevent complications post hospitalization. Goal: No admissions post 30 days from discharge of 10/26/20. Review/educate common or potential \"red flags\" of condition worsening  Evaluate adherence to medications and priority barriers to resolve;  No barriers noted           Ensure DME in place and used as instructed; 10/27: Per patient Review the Home Visit or Home Health documentation for coordinating care and goals; 10/27: [facility] 10/27/20    Communicate visits and goals between multiple providers and services    Assess for health risk behaviors and educate patient/caregivers on reducing risk; 10/27: Discussed safety concerns with patient; patient not receptive        Discuss and provide resources for ACP; Will address at a later time. 11/24: Patient voiced he has a an AMD and Antony Arboleda is HCA. Discuss and evaluate ADL performance. Provide recommendations on energy conservation, particularly related to transition home from an inpatient admission.

## 2021-08-05 NOTE — TELEPHONE ENCOUNTER
Patient called stating that he has been taking the Lamisil 250mg but he does not feel that it is helping.      He is asking if there is anything else he can take Pt called states she is around 8 wks pregnant and was using a fertility clinic advised pt we need records and our nurse will review and schedule intake appt  Pt will get records and call back

## 2021-12-15 ENCOUNTER — HOSPITAL ENCOUNTER (OUTPATIENT)
Dept: PHYSICAL THERAPY | Age: 46
Discharge: HOME | End: 2021-12-15
Payer: MEDICARE

## 2021-12-15 PROCEDURE — 97162 PT EVAL MOD COMPLEX 30 MIN: CPT

## 2021-12-15 PROCEDURE — 97110 THERAPEUTIC EXERCISES: CPT

## 2021-12-15 NOTE — PROGRESS NOTES
PHYSICAL THERAPY - DAILY TREATMENT NOTE    Patient Name: Verner Schultze        Date: 12/15/2021  : 1975   Yes Patient  Verified  Visit #:     Insurance: Payor: Anshul Raphael / Plan: Margarita Hernandez / Product Type: Managed Care Medicare /      In time: 12:10 Out time: 12:42   Total Treatment Time: 32     Medicare/BCBS Time Tracking (below)   Total Timed Codes (min):  10 1:1 Treatment Time:  10     TREATMENT AREA =  Pain in left knee [M25.562]    SUBJECTIVE  Pain Level (on 0 to 10 scale):  5   10   Medication Changes/New allergies or changes in medical history, any new surgeries or procedures?    no  If yes, update Summary List   Subjective Functional Status/Changes:  []  No changes reported     See POC         OBJECTIVE  Modalities Rationale:     10 min Therapeutic Exercise:  [x]  See flow sheet   Rationale:      increase ROM and increase strength to improve the patients ability to tolerate prolonged standing    Billed With/As:   [x] TE   [] TA   [] Neuro   [] Self Care Patient Education: [x] Review HEP    [x] Progressed/Changed HEP based on:   [x] positioning   [x] body mechanics   [] transfers   [] heat/ice application    [] other:      Other Objective/Functional Measures:    See POC     Post Treatment Pain Level (on 0 to 10) scale:   5  / 10     ASSESSMENT  Assessment/Changes in Function:     See POC     []  See Progress Note/Recertification   Patient will continue to benefit from skilled PT services to modify and progress therapeutic interventions, address functional mobility deficits, address ROM deficits, address strength deficits, analyze and address soft tissue restrictions, analyze and cue movement patterns, analyze and modify body mechanics/ergonomics, assess and modify postural abnormalities and instruct in home and community integration to attain remaining goals.    Progress toward goals / Updated goals:    See newly established goals in POC     PLAN  [x] Upgrade activities as tolerated yes Continue plan of care   []  Discharge due to :    []  Other:      Therapist: Lexie Gomez    Date: 12/15/2021 Time: 7:45 AM     Future Appointments   Date Time Provider Ayaka Farnsworth   12/15/2021 12:00 PM Navneet Tran

## 2021-12-15 NOTE — PROGRESS NOTES
[address] [facility] PHYSICAL THERAPY  [address] [place] [address] 51, [address],Sonja Drummond, [address] [facility] [address] - Phone: [phone]  Fax: [phone] OF 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   ===========================================================================================  Assessment / key information: Patient is a 55 y.o. male who presents to [facility] at [facility], [facility]. with diagnosis of Pain in left knee [M25.562]. Patient reports left knee pain began 10/2020 after twisting the knee when bending over to pick something up. Reports breaking left tibia and fibula 10/2020. Notes h/o stem cell injections in the the . Pt describes left knee pain as intermittent located along the lateral aspect of the knee. Patient's pain level is rated as 1/10 at the best, 5/10 currently, and 6/10 at the worst. Knee pain increases with prolonged standing (<10 minutes) and ambulation, decreases with sitting. In addition, patient reports impaired balance with functional mobility and h/o frequent falls.    Upon objective evaluation patient presents with impaired and painful AROM of left knee (-13 to130 deg), grossly impaired EMERALD hip strength, impaired static standing balance (Romberg EO 30, NBOS EC = 3\"), and decreased flexibility of the hamstring (EMERALD ham 90/90 = 140 deg) and gastrocnemius muscles. Patient ambulates modified independently with SPC in L UE and R LE AFO. Gait analysis revealed R LE circumduction to advance limb and increase L hip ER and significant L knee valgus. Transfers from sit to stand modified independently without use of EMERALD UEs, however, demonstrates decreased safety with transfer. Transfers stand to sit modified independently with use of L UE. Patient scored 59/100 on FOTO indicating decreased function and quality of life. Patient can benefit from skilled PT to increase knee ROM, strength, joint mobility, flexibility, and balance, decrease swelling, tone, TTP, and pain to improve overall function and quality of life. Joint Strength MMT    Left Right   Hip Flexion 5- 4    Extension NT NT    Abduction 3 NT   Knee Flexion 5 4    Extension 5- 3+   Ankle Plantarflexion 2+ 2-    Dorsiflexion 4+ 4-     ===========================================================================================  Eval Complexity: History HIGH Complexity :3+ comorbidities / personal factors will impact the outcome/ POC ;  Examination  HIGH Complexity : 4+ Standardized tests and measures addressing body structure, function, activity limitation and / or participation in recreation ; Presentation MEDIUM Complexity : Evolving with changing characteristics ;   Decision Making MEDIUM Complexity : FOTO score of 26-74; Overall Complexity MEDIUM  Problem List: pain affecting function, decrease ROM, decrease strength, edema affecting function, impaired gait/ balance, decrease ADL/ functional abilitiies, decrease activity tolerance, decrease flexibility/ joint mobility and decrease transfer abilities   Treatment Plan may include any combination of the following: Therapeutic exercise, Therapeutic activities, Neuromuscular re-education, Physical agent/modality, Gait/balance training, Manual therapy, Patient education, Self Care training, Functional mobility training, Home safety training and Stair training  Patient / Family readiness to learn indicated by: asking questions, trying to perform skills and interest  Persons(s) to be included in education: patient (P)  Barriers to Learning/Limitations: yes;  Cognitive/Speech secondary to TBI  Measures taken, if barriers to learning: Simple one step instructions, visual cues   Patient Goal (s): \"get rid of pain\"   Patient self reported health status: good  Rehabilitation Potential: good   Short Term Goals: To be accomplished in  2  weeks:  1) Establish home exercise program.  2) Patient to perform 2x10 of bridges in order to improve EMERALD knee stability to improve ease with prolonged standing.  Long Term Goals: To be accomplished in  6  weeks:  1) Patient will report decreased c/o pain to < or = 5/10 to facilitate prolonged standing with manageable sx in the left knee. 2) Increase FOTO to 66/100 indicating improved function and quality of life. 3) Patient maintain EMERALD heel to arch for 20\" in order to improve safety with standing ADLs. Frequency / Duration:   Patient to be seen  2  times per week for 6  weeks:  Patient / Caregiver education and instruction: self care, activity modification, brace/ splint application and exercises  Therapist Signature: Mary Nelson Date: 94/89/2430   Certification Period: 12/15/2021 to 3/14/2022 Time: 7:47 AM   ===========================================================================================  I certify that the above Physical Therapy Services are being furnished while the patient is under my care. I agree with the treatment plan and certify that this therapy is necessary. Physician Signature:        Date:       Time:                                           Anisa Braxton, *  Please sign and return to [facility] at [facility], [facility]. or you may fax the signed copy to [phone]. Thank you.

## 2021-12-20 ENCOUNTER — HOSPITAL ENCOUNTER (OUTPATIENT)
Dept: PHYSICAL THERAPY | Age: 46
Discharge: HOME | End: 2021-12-20
Payer: MEDICARE

## 2021-12-20 PROCEDURE — 97112 NEUROMUSCULAR REEDUCATION: CPT

## 2021-12-20 PROCEDURE — 97110 THERAPEUTIC EXERCISES: CPT

## 2021-12-20 PROCEDURE — 97140 MANUAL THERAPY 1/> REGIONS: CPT

## 2021-12-20 NOTE — PROGRESS NOTES
PHYSICAL THERAPY - DAILY TREATMENT NOTE    Patient Name: Jeovanny Rogers        Date: 2021  : 1975   Yes Patient  Verified  Visit #:   2   of   12  Insurance: Payor: Saloni Oliveira / Plan: [address] / Product Type: Managed Care Medicare /      In time: 4:25 Out time: 510   Total Treatment Time: 45     Medicare/BCBS Time Tracking (below)   Total Timed Codes (min):  45 1:1 Treatment Time:  45     TREATMENT AREA =  Pain in left knee [M25.562]    SUBJECTIVE  Pain Level (on 0 to 10 scale):  4  / 10   Medication Changes/New allergies or changes in medical history, any new surgeries or procedures?    no  If yes, update Summary List   Subjective Functional Status/Changes:  []  No changes reported     Reports his knee feels better than it did. Been doing his HEP         OBJECTIVE  20 min Therapeutic Exercise:  [x]  See flow sheet   Rationale:      increase ROM and increase strength to improve the patients ability to tolerate prolonged standing    10 min Manual Therapy: Extension overpressure in supine, PA mobs and fibular head mobs seated, patellar glides   Rationale:      decrease pain and increase ROM to improve patient's ability to perform transfers  The manual therapy interventions were performed at a separate and distinct time from the therapeutic activities interventions. 15 min Neuromuscular Re-ed: [x]  See flow sheet   Rationale:    improve coordination, improve balance and increase proprioception to improve the patients ability to ambulate of uneven surfaces. Billed With/As:   [x] TE   [] TA   [] Neuro   [] Self Care Patient Education: [x] Review HEP    [] Progressed/Changed HEP based on:   [] positioning   [] body mechanics   [] transfers   [] heat/ice application    [] other:      Other Objective/Functional Measures:    See flow sheet for exercises performed. Lacking 12 degrees of knee extension.      Post Treatment Pain Level (on 0 to 10) scale:   2  / 10 ASSESSMENT  Assessment/Changes in Function:     Chart reviewed and subjective taken. Good effort with session. Unable to use bilateral UE for SB core iso due to hemiparesis. Pt educated on mental repititions for RLE. []  See Progress Note/Recertification   Patient will continue to benefit from skilled PT services to modify and progress therapeutic interventions, address functional mobility deficits, address ROM deficits, address strength deficits, analyze and address soft tissue restrictions, analyze and cue movement patterns, analyze and modify body mechanics/ergonomics, assess and modify postural abnormalities and instruct in home and community integration to attain remaining goals. Progress toward goals / Updated goals:    No progress with goals today.      PLAN  [x]  Upgrade activities as tolerated yes Continue plan of care   []  Discharge due to :    []  Other:      Therapist: Federica Rehman PT    Date: 12/20/2021 Time: 5:45 PM     Future Appointments   Date Time Provider Ayaka Farnsworth   12/20/2021  4:30 PM Joel Vasques, PT [place] [address]   12/23/2021 10:00 AM Zay Poole, PT [facility] [address]   1/3/2022 10:30 AM Joel Vasques, PT [facility] [address]   1/6/2022  1:15 PM João Oakley, PT [facility] [address]   1/10/2022  1:00 PM João Oakley, PT [facility] [address]   1/13/2022  1:30 PM João Oakley, PT [facility] [address]   1/17/2022  1:00 PM João Oakley, PT [facility] [address]   1/20/2022 11:45 AM João Oakley, PT [facility] [address]   1/24/2022  1:00 PM João Oakley, PT [facility] [address]   1/27/2022 11:45 AM Ivan Johnson, PT [place] [address]

## 2021-12-23 ENCOUNTER — HOSPITAL ENCOUNTER (OUTPATIENT)
Dept: PHYSICAL THERAPY | Age: 46
Discharge: HOME | End: 2021-12-23
Payer: MEDICARE

## 2021-12-23 PROCEDURE — 97110 THERAPEUTIC EXERCISES: CPT

## 2021-12-23 NOTE — PROGRESS NOTES
PHYSICAL THERAPY - DAILY TREATMENT NOTE    Patient Name: Sophia Stroud        Date: 2021  : 1975   yes Patient  Verified  Visit #:   3   of   12  Insurance: Payor: [address] [place] [address] / Plan: [facility] MEDICARE COMPLETE / Product Type: Managed Care Medicare /      In time: 479 Out time: 5418   Total Treatment Time: 45     Medicare/BCBS Time Tracking (below)   Total Timed Codes (min):  45 1:1 Treatment Time:  45     TREATMENT AREA =  Pain in left knee [M25.562]    SUBJECTIVE  Pain Level (on 0 to 10 scale):  1-2  / 10   Medication Changes/New allergies or changes in medical history, any new surgeries or procedures?    no  If yes, update Summary List   Subjective Functional Status/Changes:  []  No changes reported     Patient reports he has been staying consistent with his HEP and feels his knee not collapsing as much when he walks. OBJECTIVE  45 min Therapeutic Exercise:  [x]  See flow sheet   Rationale:      increase ROM and increase strength to improve the patients ability to perform walking activities. Billed With/As:   [x] TE   [] TA   [] Neuro   [] Self Care Patient Education: [x] Review HEP    [] Progressed/Changed HEP based on:   [] positioning   [] body mechanics   [] transfers   [] heat/ice application    [] other:      Other Objective/Functional Measures:    1:1 TE 39'  Patient demonstrating good knowledge of exercise program indicating appropriate carry over from previous session  CgAx1 required for sit to stand exercise, cued to focus on full knee extension when in standing position     Post Treatment Pain Level (on 0 to 10) scale:   1-2  / 10     ASSESSMENT  Assessment/Changes in Function:     Held on balancing exercises this session due to time constraints. Plan to perform at beginning of next session to allow for therapist to perform these exercises with patient.       []  See Progress Note/Recertification   Patient will continue to benefit from skilled PT services to modify and progress therapeutic interventions, address functional mobility deficits, address ROM deficits, address strength deficits, analyze and address soft tissue restrictions, analyze and cue movement patterns, analyze and modify body mechanics/ergonomics and assess and modify postural abnormalities to attain remaining goals.    Progress toward goals / Updated goals:    Met STG#1     PLAN  [x]  Upgrade activities as tolerated yes Continue plan of care   []  Discharge due to :    []  Other:      Therapist: Jeanie Gaming PT    Date: 12/23/2021 Time: 10:50 AM     Future Appointments   Date Time Provider Ayaka Farnsworth   1/3/2022 10:30 AM David Andrade, PT [facility]   1/6/2022  1:15 PM Yariel Mascorro PT [address]   1/10/2022  1:00 PM Yariel Mascorro PT [address]   1/13/2022  1:30 PM Yariel Mascorro PT [facility]   1/17/2022  1:00 PM Yariel Mascorro PT [facility]   1/20/2022 11:45 AM Yariel Mascorro PT [facility]   1/24/2022  1:00 PM Yariel Mascorro PT [facility]   1/27/2022 11:45 AM Marquis Kvng Johnson, PT [address]

## 2022-01-03 ENCOUNTER — HOSPITAL ENCOUNTER (OUTPATIENT)
Dept: PHYSICAL THERAPY | Age: 47
Discharge: HOME | End: 2022-01-03
Payer: MEDICARE

## 2022-01-03 PROCEDURE — 97112 NEUROMUSCULAR REEDUCATION: CPT

## 2022-01-03 PROCEDURE — 97110 THERAPEUTIC EXERCISES: CPT

## 2022-01-03 NOTE — PROGRESS NOTES
PHYSICAL THERAPY - DAILY TREATMENT NOTE    Patient Name: Mayte Montelongo        Date: 1/3/2022  : 1975   yes Patient  Verified  Visit #:      12  Insurance: Payor: Sybil Goodell / Plan: [address] / Product Type: Managed Care Medicare /      In time: 10:22 Out time: 11:05   Total Treatment Time: 43     Medicare/[facility] Time Tracking (below)   Total Timed Codes (min):  43 1:1 Treatment Time:  43     TREATMENT AREA =  Pain in left knee [M25.562]    SUBJECTIVE  Pain Level (on 0 to 10 scale):  1  / 10   Medication Changes/New allergies or changes in medical history, any new surgeries or procedures?    no  If yes, update Summary List   Subjective Functional Status/Changes:  []  No changes reported     Reports no issues since LV. Reports knee pain has improved a lot and he's been \"keeping up\" with his HEP. OBJECTIVE  33 min Therapeutic Exercise:  [x]  See flow sheet   Rationale:      increase ROM and increase strength to improve the patients ability to perform walking activities. 10 min Neuromuscular Re-ed: [x]  See flow sheet   Rationale:    improve coordination, improve balance and increase proprioception to improve the patients ability to ambulate on uneven and even surfaces. Billed With/As:   [x] TE   [] TA   [] Neuro   [] Self Care Patient Education: [x] Review HEP    [] Progressed/Changed HEP based on:   [] positioning   [] body mechanics   [] transfers   [] heat/ice application    [] other:      Other Objective/Functional Measures:    See flow sheet for exercises performed. SBAx1 required for all balance exercises and sit to stand exercise; cued to focus on full knee extension when in standing position     Post Treatment Pain Level (on 0 to 10) scale:   1  / 10     ASSESSMENT  Assessment/Changes in Function:     Session initiated with balance exercises due to time constraint LV. Good tolerance to balance with VC for knee extension and core stabilization. Continue to progress LE exercises in WB as safety permits. []  See Progress Note/Recertification   Patient will continue to benefit from skilled PT services to modify and progress therapeutic interventions, address functional mobility deficits, address ROM deficits, address strength deficits, analyze and address soft tissue restrictions, analyze and cue movement patterns, analyze and modify body mechanics/ergonomics and assess and modify postural abnormalities to attain remaining goals.    Progress toward goals / Updated goals:    Improvement in [address] on L with sit to stands     PLAN  [x]  Upgrade activities as tolerated yes Continue plan of care   []  Discharge due to :    []  Other:      Therapist: Korina Walters, PT    Date: 1/3/2022 Time: 11:50 AM     Future Appointments   Date Time Provider Ayaka Farnsworth   1/3/2022 10:30 AM Lucie Boucher,  [address] [facility]   1/6/2022  1:15 PM Lalajanet Martinoo, PT [address]   1/10/2022  1:00 PM Lala Bulmaro, PT [address]   1/13/2022  1:30 PM Lala Lakemore, PT [address]   1/17/2022  1:00 PM Lala Bulmaro, PT [address]   1/20/2022 11:45 AM Lalajanet Martinoo, PT [address]   1/24/2022  1:00 PM Lalajanet Martinoo,  [facility]   1/27/2022 11:45 AM Nubia Johnson, PT [address]

## 2022-01-06 ENCOUNTER — HOSPITAL ENCOUNTER (OUTPATIENT)
Dept: PHYSICAL THERAPY | Age: 47
Discharge: HOME | End: 2022-01-06
Payer: MEDICARE

## 2022-01-06 PROCEDURE — 97110 THERAPEUTIC EXERCISES: CPT

## 2022-01-06 PROCEDURE — 97112 NEUROMUSCULAR REEDUCATION: CPT

## 2022-01-06 PROCEDURE — 97140 MANUAL THERAPY 1/> REGIONS: CPT

## 2022-01-06 NOTE — PROGRESS NOTES
PHYSICAL THERAPY - DAILY TREATMENT NOTE    Patient Name: Kate Jorgensen        Date: 2022  : 1975   yes Patient  Verified  Visit #:   5   of   12  Insurance: Payor: Junior Yao / Plan: [facility] MEDICARE CHOICE PPO/PFFS / Product Type: Managed Care Medicare /      In time: 1:15 Out time: 2:00   Total Treatment Time: 45     Medicare/[facility] Time Tracking (below)   Total Timed Codes (min):  45 1:1 Treatment Time:  45     TREATMENT AREA =  Pain in left knee [M25.562]    SUBJECTIVE  Pain Level (on 0 to 10 scale):  0  / 10   Medication Changes/New allergies or changes in medical history, any new surgeries or procedures?    no  If yes, update Summary List   Subjective Functional Status/Changes:  []  No changes reported     Reports some medical Hx. Reports the knees are doing better. He feels standing and walking tolerance are improved. He think he wishes to go down to 1x/wk. OBJECTIVE  20 min Therapeutic Exercise:  [x]  See flow sheet   Rationale:      increase ROM and increase strength to improve the patients ability to perform walking activities. 15 min Neuromuscular Re-ed: [x]  See flow sheet   Rationale:    improve coordination, improve balance and increase proprioception to improve the patients ability to ambulate on uneven and even surfaces. 10 min Manual Therapy: Sitting AP/PA mobs and knee ext OP with patellar mobs   Rationale:      decrease pain, increase ROM and increase tissue extensibility to improve patient's ability to tolerate loading to the knee  The manual therapy interventions were performed at a separate and distinct time from the therapeutic activities interventions.     Billed With/As:   [x] TE   [] TA   [] Neuro   [] Self Care Patient Education: [x] Review HEP    [] Progressed/Changed HEP based on:   [] positioning   [] body mechanics   [] transfers   [] heat/ice application    [] other:      Other Objective/Functional Measures:    See flowsheet for drills, loads and volume     Post Treatment Pain Level (on 0 to 10) scale:   0  / 10     ASSESSMENT  Assessment/Changes in Function:     Chart reviewed and subjective taken. Pt presentation of noted structural valgus and difficulty in coordination. Pt with 5/5 strength in the knee. Pt with good tolerance to session. Will complete 3x more visits over this month and likely DC to HEP. []  See Progress Note/Recertification   Patient will continue to benefit from skilled PT services to modify and progress therapeutic interventions, address functional mobility deficits, address ROM deficits, address strength deficits, analyze and address soft tissue restrictions, analyze and cue movement patterns, analyze and modify body mechanics/ergonomics and assess and modify postural abnormalities to attain remaining goals. Progress toward goals / Updated goals: · Short Term Goals: To be accomplished in  2  weeks:  1) Establish home exercise program.Status: Compliant to date  2) Patient to perform 2x10 of bridges in order to improve EMERALD knee stability to improve ease with prolonged standing. Status: Compliant to date    · Long Term Goals: To be accomplished in  6  weeks:  1) Patient will report decreased c/o pain to < or = 5/10 to facilitate prolonged standing with manageable sx in the left knee. 2) Increase FOTO to 66/100 indicating improved function and quality of life. 3) Patient maintain EMERALD heel to arch for 20\" in order to improve safety with standing ADLs.      PLAN  [x]  Upgrade activities as tolerated yes Continue plan of care   []  Discharge due to :    []  Other: 1x/wk visits     Therapist: Donold Curling, PT    Date: 1/6/2022 Time: 11:50 AM     Future Appointments   Date Time Provider Ayaka Farnsworth   1/10/2022  1:00 PM Rea Lennox, PT [place] [address]   1/13/2022  1:30 PM Rea Lennox, PT [facility]   1/17/2022  1:00 PM Rea Lennox, PT [facility]   1/20/2022 11:45 AM Linda Johnson PT [facility]   1/24/2022  1:00 PM Brenda Gusman, PT [facility]   1/27/2022 11:45 AM Jay Johnson, PT [place] [address]

## 2022-01-10 ENCOUNTER — HOSPITAL ENCOUNTER (OUTPATIENT)
Dept: PHYSICAL THERAPY | Age: 47
Discharge: HOME | End: 2022-01-10
Payer: MEDICARE

## 2022-01-10 PROCEDURE — 97110 THERAPEUTIC EXERCISES: CPT

## 2022-01-10 PROCEDURE — 97140 MANUAL THERAPY 1/> REGIONS: CPT

## 2022-01-10 PROCEDURE — 97112 NEUROMUSCULAR REEDUCATION: CPT

## 2022-01-10 NOTE — PROGRESS NOTES
PHYSICAL THERAPY - DAILY TREATMENT NOTE    Patient Name: Reji Cardozo        Date: 1/10/2022  : 1975   yes Patient  Verified  Visit #:      12  Insurance: Payor: Carito Guerra / Plan: [facility] [employer] MEDICARE CHOICE PPO/PFFS / Product Type: Managed Care Medicare /      In time: 1:00 Out time: 1:45   Total Treatment Time: 45     Medicare/[employer] Time Tracking (below)   Total Timed Codes (min):  45 1:1 Treatment Time:  45     TREATMENT AREA =  Pain in left knee [M25.562]    SUBJECTIVE  Pain Level (on 0 to 10 scale):  0  / 10   Medication Changes/New allergies or changes in medical history, any new surgeries or procedures?    no  If yes, update Summary List   Subjective Functional Status/Changes:  []  No changes reported     Says the knee felt really good after last session. Says he was able to put his knee out on the coffee table without pain and that felt really good. Attributes this to the manual therapy. OBJECTIVE  20 min Therapeutic Exercise:  [x]  See flow sheet   Rationale:      increase ROM and increase strength to improve the patients ability to perform walking activities. 15 min Neuromuscular Re-ed: [x]  See flow sheet   Rationale:    improve coordination, improve balance and increase proprioception to improve the patients ability to ambulate on uneven and even surfaces. 10 min Manual Therapy: Sitting AP/PA mobs and knee ext OP with patellar mobs   Rationale:      decrease pain, increase ROM and increase tissue extensibility to improve patient's ability to tolerate loading to the knee  The manual therapy interventions were performed at a separate and distinct time from the therapeutic activities interventions.     Billed With/As:   [x] TE   [] TA   [] Neuro   [] Self Care Patient Education: [x] Review HEP    [] Progressed/Changed HEP based on:   [] positioning   [] body mechanics   [] transfers   [] heat/ice application    [] other:      Other Objective/Functional Measures:    See flowsheet for drills, loads and volume     Post Treatment Pain Level (on 0 to 10) scale:   0  / 10     ASSESSMENT  Assessment/Changes in Function:     Pt with good response to current POC. Able to add in standing ankle drills with high challenge. Stepping over 2x4 a challenge and unable to complete safely side-side. Table drills continued with good result. POC at 1x/wk. []  See Progress Note/Recertification   Patient will continue to benefit from skilled PT services to modify and progress therapeutic interventions, address functional mobility deficits, address ROM deficits, address strength deficits, analyze and address soft tissue restrictions, analyze and cue movement patterns, analyze and modify body mechanics/ergonomics and assess and modify postural abnormalities to attain remaining goals. Progress toward goals / Updated goals: · Short Term Goals: To be accomplished in  2  weeks:  1) Establish home exercise program.Status: Compliant to date  2) Patient to perform 2x10 of bridges in order to improve EMERALD knee stability to improve ease with prolonged standing. Status: Compliant to date    · Long Term Goals: To be accomplished in  6  weeks:  1) Patient will report decreased c/o pain to < or = 5/10 to facilitate prolonged standing with manageable sx in the left knee. 2) Increase FOTO to 66/100 indicating improved function and quality of life. 3) Patient maintain EMERALD heel to arch for 20\" in order to improve safety with standing ADLs.      PLAN  [x]  Upgrade activities as tolerated yes Continue plan of care   []  Discharge due to :    []  Other: 1x/wk visits     Therapist: Romayne Denis, PT    Date: 1/10/2022 Time: 11:50 AM     Future Appointments   Date Time Provider Ayaka Farnsworth   1/13/2022  1:30 PM FLORINDA [address]   1/17/2022  1:00 PM Linda Busby PT [facility]   1/20/2022 11:45 AM Linda Busby PT [facility]   1/24/2022  1:00 PM Elizabeth Nubia Sauer, PT [facility]   1/27/2022 11:45 AM Nubia Johnson, PT [place] [address]

## 2022-01-13 ENCOUNTER — APPOINTMENT (OUTPATIENT)
Dept: PHYSICAL THERAPY | Age: 47
End: 2022-01-13
Payer: MEDICARE

## 2022-01-17 ENCOUNTER — HOSPITAL ENCOUNTER (OUTPATIENT)
Dept: PHYSICAL THERAPY | Age: 47
Discharge: HOME | End: 2022-01-17
Payer: MEDICARE

## 2022-01-17 PROCEDURE — 97140 MANUAL THERAPY 1/> REGIONS: CPT

## 2022-01-17 PROCEDURE — 97110 THERAPEUTIC EXERCISES: CPT

## 2022-01-17 PROCEDURE — 97112 NEUROMUSCULAR REEDUCATION: CPT

## 2022-01-17 NOTE — PROGRESS NOTES
PHYSICAL THERAPY - DAILY TREATMENT NOTE    Patient Name: Neeraj Brar        Date: 2022  : 1975   yes Patient  Verified  Visit #:      of   12  Insurance: Payor: Mike Ashton / Plan: [facility] [employer] MEDICARE CHOICE PPO/PFFS / Product Type: Managed Care Medicare /      In time: 1:00 Out time: 1:45   Total Treatment Time: 45     Medicare/[facility] Time Tracking (below)   Total Timed Codes (min):  45 1:1 Treatment Time:  45     TREATMENT AREA =  Pain in left knee [M25.562]    SUBJECTIVE  Pain Level (on 0 to 10 scale):  0  / 10   Medication Changes/New allergies or changes in medical history, any new surgeries or procedures?    no  If yes, update Summary List   Subjective Functional Status/Changes:  []  No changes reported     Reports the pain has been better. 0,1,2 out of 10 over the past few days. . Reports more pain on the outside of the knee. Feels like its from it being bent in. Says he is about 75% of where he wants to be. He's really doing pretty good. Pt's wife reports he has trouble with walking after just standing out of a chair. OBJECTIVE  20 min Therapeutic Exercise:  [x]  See flow sheet   Rationale:      increase ROM and increase strength to improve the patients ability to perform walking activities. 15 min Neuromuscular Re-ed: [x]  See flow sheet   Rationale:    improve coordination, improve balance and increase proprioception to improve the patients ability to ambulate on uneven and even surfaces. 10 min Manual Therapy: Sitting AP/PA mobs and knee ext OP with post capsule and distal med HS STM, with patellar mobs   Rationale:      decrease pain, increase ROM and increase tissue extensibility to improve patient's ability to tolerate loading to the knee  The manual therapy interventions were performed at a separate and distinct time from the therapeutic activities interventions.     Billed With/As:   [x] TE   [] TA   [] Neuro   [] Self Care Patient Education: [x] Review HEP [] Progressed/Changed HEP based on:   [] positioning   [] body mechanics   [] transfers   [] heat/ice application    [] other:      Other Objective/Functional Measures:    See flowsheet for drills, loads and volume     Post Treatment Pain Level (on 0 to 10) scale:   0  / 10     ASSESSMENT  Assessment/Changes in Function:     Pt with good reduction in pain reporting in intensity. Functional change is not seen in FOTO score. Balance remains a challenge at this time as well. QOL improvements are noted in GROC score of 5+ indicating \"much better\" than SOC. Drills completed in session along with testing. Due to progress pt to likely DC to HEP NV.     []  See Progress Note/Recertification   Patient will continue to benefit from skilled PT services to modify and progress therapeutic interventions, address functional mobility deficits, address ROM deficits, address strength deficits, analyze and address soft tissue restrictions, analyze and cue movement patterns, analyze and modify body mechanics/ergonomics and assess and modify postural abnormalities to attain remaining goals. Progress toward goals / Updated goals: · Short Term Goals: To be accomplished in  2  weeks:  1) Establish home exercise program.Status: Compliant to date  2) Patient to perform 2x10 of bridges in order to improve EMERALD knee stability to improve ease with prolonged standing. Status: MET    · Long Term Goals: To be accomplished in  6  weeks:  1) Patient will report decreased c/o pain to < or = 5/10 to facilitate prolonged standing with manageable sx in the left knee. Status: 0-2/10 pain overall  2) Increase FOTO to 66/100 indicating improved function and quality of life. Status: 59/100 (was: 59/100)   3) Patient maintain EMERALD heel to arch for 20\" in order to improve safety with standing ADLs. Status: MET     PLAN  [x]  Upgrade activities as tolerated yes Continue plan of care   []  Discharge due to :    []  Other: 1x/wk visits     Therapist: Junior Clemente, PT DPT [facility]    Date: 1/17/2022 Time: 11:50 AM     Future Appointments   Date Time Provider Ayaka Farnsworth   1/24/2022  1:00 PM Nubia Johnson, PT [place] [address]

## 2022-01-17 NOTE — PROGRESS NOTES
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 Medications: Verified on Patient Summary List   Vis[facility] from [facility]: 7 Missed Visits: -     Subjective: Reports the pain has been better. 0,1,2 out of 10 over the past few days. . Reports more pain on the outside of the knee. Feels like its from it being bent in. Says he is about 75% of where he wants to be. He's really doing pretty good. Pt's wife reports he has trouble with walking after just standing out of a chair. Goal/Measure of Progress Goal Met? 1. STG1 Pt to report adherence and demonstrate compliance with basic HEP to allow progress between visits   Status at last Eval: Initiated Current Status: Compliant yes   2. Patient to perform 2x10 of bridges in order to improve EMERALD knee stability to improve ease with prolonged standing   Status at last Eval: Difficulty Current Status: Able yes   3. Patient will report decreased c/o pain to < or = 5/10 to facilitate prolonged standing with manageable sx in the left knee   Status at last Eval: 5/10 Current Status: 0-2/10 yes    3) Patient maintain EMERALD heel to arch for 20\" in order to improve safety with standing ADLs. Status: MET    Key Functional Changes/Progress: Pt with good reduction in pain reporting in intensity. Functional change is not seen in FOTO score.  Balance remains a challenge at this time as well. QOL improvements are noted in GROC score of 5+ indicating \"much better\" than SOC. Drills completed in session along with testing. Due to progress pt to likely DC to HEP NV. Problem List: pain affecting function, decrease ROM, decrease strength, impaired gait/ balance and decrease ADL/ functional abilitiies   Treatment Plan may include any combination of the following: Therapeutic exercise, Therapeutic activities, Neuromuscular re-education, Physical agent/modality, Gait/balance training, Manual therapy, Patient education, Self Care training and Functional mobility training     Patient Goal(s) has been updated and includes:  Preparation for DC and maintenance     Goals for this certification period include and are to be achieved in   1  weeks:  2) Increase FOTO to 66/100 indicating improved function and quality of life. Status: 59/100 (was: 59/100) -DC this GOAL: Pt reports a 5+ on Global Rating of Change (GROC). Frequency / Duration:   Patient to be seen   1   times per week for   1   more weeks. Assessments/Recommendations:   Continue to progress this patient, as able, towards new/stated goals and DC to HEP likely NV. If you have any questions/comments please contact us directly at [phone]. Thank you for allowing us to assist in the care of your patient.     Therapist Signature: Sarah Lawler, PT Date: 5/69/4563   Certification Period:  Reporting Period: 12/15/21 - 3/14/22  12/15/21 - 1/17/22 Time: 2:01 PM   NOTE TO PHYSICIAN:  PLEASE COMPLETE THE ORDERS BELOW AND FAX TO   [facility]: [phone]  If you are unable to process this request in 24 hours please contact our office: [phone]    ___ I have read the above report and request that my patient continue as recommended.   ___ I have read the above report and request that my patient continue therapy with the following changes/special instructions: ________________________________________________   ___ I have read the above report and request that my patient be discharged from therapy.      Physician Signature:        Date:       Time:                                       Vic Jacques, *

## 2022-01-20 ENCOUNTER — APPOINTMENT (OUTPATIENT)
Dept: PHYSICAL THERAPY | Age: 47
End: 2022-01-20
Payer: MEDICARE

## 2022-01-24 ENCOUNTER — HOSPITAL ENCOUNTER (OUTPATIENT)
Dept: PHYSICAL THERAPY | Age: 47
Discharge: HOME | End: 2022-01-24
Payer: MEDICARE

## 2022-01-24 PROCEDURE — 97112 NEUROMUSCULAR REEDUCATION: CPT

## 2022-01-24 PROCEDURE — 97110 THERAPEUTIC EXERCISES: CPT

## 2022-01-24 PROCEDURE — 97140 MANUAL THERAPY 1/> REGIONS: CPT

## 2022-01-24 NOTE — PROGRESS NOTES
PHYSICAL THERAPY - DAILY TREATMENT NOTE    Patient Name: Lukas Martinez        Date: 2022  : 1975   yes Patient  Verified  Visit #:   8   of   12  Insurance: Payor: Moises Marie / Plan: [facility] [employer] MEDICARE CHOICE PPO/PFFS / Product Type: Managed Care Medicare /      In time: 1:00 Out time: 1:45   Total Treatment Time: 45     Medicare/[facility] Time Tracking (below)   Total Timed Codes (min):  45 1:1 Treatment Time:  45     TREATMENT AREA =  Pain in left knee [M25.562]    SUBJECTIVE  Pain Level (on 0 to 10 scale):  0  / 10   Medication Changes/New allergies or changes in medical history, any new surgeries or procedures?    no  If yes, update Summary List   Subjective Functional Status/Changes:  []  No changes reported     Pt presents with concerns over DC and wishing to continue to work on the knee. Says still doing well with the program.       OBJECTIVE  20 min Therapeutic Exercise:  [x]  See flow sheet   Rationale:      increase ROM and increase strength to improve the patients ability to perform walking activities. 15 min Neuromuscular Re-ed: [x]  See flow sheet   Rationale:    improve coordination, improve balance and increase proprioception to improve the patients ability to ambulate on uneven and even surfaces. 10 min Manual Therapy: Sitting AP/PA mobs and knee ext OP with post capsule and distal med HS STM, with patellar mobs, bilateral Hip PROM   Rationale:      decrease pain, increase ROM and increase tissue extensibility to improve patient's ability to tolerate loading to the knee  The manual therapy interventions were performed at a separate and distinct time from the therapeutic activities interventions.     Billed With/As:   [x] TE   [] TA   [] Neuro   [] Self Care Patient Education: [x] Review HEP    [] Progressed/Changed HEP based on:   [] positioning   [] body mechanics   [] transfers   [] heat/ice application    [] other:      Other Objective/Functional Measures:    See flowsheet for drills, loads and volume     Post Treatment Pain Level (on 0 to 10) scale:   0  / 10     ASSESSMENT  Assessment/Changes in Function:     Pt presenting with concerns over DC at this time. Some miscommunication on goals. Based on pt presentation will extend POC for 4 more visits over 6 weeks to ensure knee and LE improves to avoid falls. Both parties agreed to [address] [facility] [address] [facility] [address]. Pt continues to have poor/no tolerance for SLS activity on the left LE which causes increased valgus and rotational shear. This occurs in side stepping and some balance positions. Pt instructed to use knee brace with med/lat support. POC to continue to focus on LE functional strength and balance, as able, with DC to HEP/maintenance. []  See Progress Note/Recertification   Patient will continue to benefit from skilled PT services to modify and progress therapeutic interventions, address functional mobility deficits, address ROM deficits, address strength deficits, analyze and address soft tissue restrictions, analyze and cue movement patterns, analyze and modify body mechanics/ergonomics and assess and modify postural abnormalities to attain remaining goals. Progress toward goals / Updated goals: · Short Term Goals: To be accomplished in  2  weeks:  1) Establish home exercise program.Status: Compliant to date  2) Patient to perform 2x10 of bridges in order to improve EMERALD knee stability to improve ease with prolonged standing. Status: MET    · Long Term Goals: To be accomplished in  6  weeks:  1) Patient will report decreased c/o pain to < or = 5/10 to facilitate prolonged standing with manageable sx in the left knee. Status: 0-2/10 pain overall  2) Increase FOTO to 66/100 indicating improved function and quality of life. Status: 59/100 (was: 59/100)   3) Patient maintain EMERALD heel to arch for 20\" in order to improve safety with standing ADLs. Status: MET     PLAN  [x]  Upgrade activities as tolerated yes Continue plan of care   [] Discharge due to :    []  Other: 1x/wk visits     Therapist: Magy Aviles, PT DPT [facility]    Date: 1/24/2022 Time: 11:50 AM     Future Appointments   Date Time Provider Ayaka Farnsworth   1/24/2022  1:00 PM Maria Dolores Patel, PT [place] [address]   1/31/2022  9:15 AM Santiago Holder,  [address] [facility]   2/7/2022  1:00 PM Kamari Hamper [address] [facility]   2/14/2022  2:30 PM Maria Dolores Patel,  [address] [facility]   2/21/2022  1:00 PM Maria Dolores Patel,  [address] [facility]   2/28/2022  1:00 PM Maria Dolores Patel,  [address] [facility]   3/7/2022  1:00 PM Maria Dolores Patel,  [address] [facility]   3/14/2022  1:00 PM Maria Dolores Patel,  [address] [facility]   3/21/2022  1:00 PM Maria Dolores Patel,  [address] [facility]   3/28/2022  1:00 PM Rogelio Johnson, PT [place] [address]

## 2022-01-24 NOTE — PROGRESS NOTES
100 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 Medications: Verified on Patient Summary List   Visits from [facility]: 8 Missed Visits: -       Goal/Measure of Progress Goal Met? 1. STG1 Pt to report adherence and demonstrate compliance with basic HEP to allow progress between visits   Status at last Eval: Initiated Current Status: Compliant yes   2. Patient to perform 2x10 of bridges in order to improve EMERALD knee stability to improve ease with prolonged standing   Status at last Eval: Difficulty Current Status: Able yes   3. Patient will report decreased c/o pain to < or = 5/10 to facilitate prolonged standing with manageable sx in the left knee   Status at last Eval: 5/10 Current Status: 0-2/10 yes    3) Patient maintain EMERALD heel to arch for 20\" in order to improve safety with standing ADLs. Status: MET    Key Functional Changes/Progress: Pt presenting with concerns over DC at this time. Some miscommunication on goals. Based on pt presentation will extend POC for 4 more visits over 6 weeks to ensure knee and LE improves to avoid falls. Both parties agreed to [address]. Pt continues to have poor/no tolerance for SLS activity on the left LE which causes increased valgus and rotational shear. This occurs in side stepping and some balance positions. Pt instructed to use knee brace with med/lat support. POC to continue to focus on LE functional strength and balance, as able, with DC to HEP/maintenance. Problem List: pain affecting function, decrease ROM, decrease strength, impaired gait/ balance and decrease ADL/ functional abilitiies   Treatment Plan may include any combination of the following: Therapeutic exercise, Therapeutic activities, Neuromuscular re-education, Physical agent/modality, Gait/balance training, Manual therapy, Patient education, Self Care training and Functional mobility training     Patient Goal(s) has been updated and includes:  Preparation for DC and maintenance     Goals for this certification period include and are to be achieved in   1  weeks:  2) Increase FOTO to 66/100 indicating improved function and quality of life. Status: 59/100 (was: 59/100) -DC this GOAL: Pt reports a 5+ on Global Rating of Change (GROC). 3) Pt to deny falls over past 4 weeks. Frequency / Duration:   Patient to be seen   4 more visits over 6 weeks    Assessments/Recommendations:   Continue to progress this patient, as able, towards new/stated goals and DC to HEP. If you have any questions/comments please contact us directly at [phone]. Thank you for allowing us to assist in the care of your patient.     Therapist Signature: Magy Aviles PT Date: 0/11/0170   Certification Period:  Reporting Period: 12/15/21 - 3/14/22  12/15/21 - 1/17/22 Time: 2:01 PM   NOTE TO PHYSICIAN:  PLEASE COMPLETE THE ORDERS BELOW AND FAX TO   [facility] Physical Therapy: [phone]  If you are unable to process this request in 24 hours please contact our office: [phone]    ___ I have read the above report and request that my patient continue as recommended.   ___ I have read the above report and request that my patient continue therapy with the following changes/special instructions: ________________________________________________   ___ I have read the above report and request that my patient be discharged from therapy.      Physician Signature:        Date:       Time:                                       Adaline Field, *

## 2022-01-27 ENCOUNTER — APPOINTMENT (OUTPATIENT)
Dept: PHYSICAL THERAPY | Age: 47
End: 2022-01-27
Payer: MEDICARE

## 2022-01-31 ENCOUNTER — APPOINTMENT (OUTPATIENT)
Dept: PHYSICAL THERAPY | Age: 47
End: 2022-01-31
Payer: MEDICARE

## 2022-02-07 ENCOUNTER — HOSPITAL ENCOUNTER (OUTPATIENT)
Dept: PHYSICAL THERAPY | Age: 47
Discharge: HOME | End: 2022-02-07
Payer: MEDICARE

## 2022-02-07 PROCEDURE — 97112 NEUROMUSCULAR REEDUCATION: CPT

## 2022-02-07 PROCEDURE — 97110 THERAPEUTIC EXERCISES: CPT

## 2022-02-07 NOTE — PROGRESS NOTES
PHYSICAL THERAPY - DAILY TREATMENT NOTE    Patient Name: Dontae Castaneda        Date: 2022  : 1975   yes Patient  Verified  Visit #:     Insurance: Payor: Pepe Soto / Plan: [facility] HU[employer] MEDICARE CHOICE PPO/PFFS / Product Type: Managed Care Medicare /      In time: 1:05 Out time: 150   Total Treatment Time: 45     Medicare/[employer] Time Tracking (below)   Total Timed Codes (min):  45 1:1 Treatment Time:  45     TREATMENT AREA =  Pain in left knee [M25.562]    SUBJECTIVE  Pain Level (on 0 to 10 scale): 0  / 10   Medication Changes/New allergies or changes in medical history, any new surgeries or procedures?    no  If yes, update Summary List   Subjective Functional Status/Changes:  []  No changes reported     No problems over the weekend, no new c/o. Been keeping up with the exercises at home and they are going well. OBJECTIVE  30 min Therapeutic Exercise:  [x]  See flow sheet   Rationale:      increase ROM and increase strength to improve the patients ability to perform walking activities. 15 min Neuromuscular Re-ed: [x]  See flow sheet   Rationale:    improve coordination, improve balance and increase proprioception to improve the patients ability to ambulate on uneven and even surfaces. Billed With/As:   [x] TE   [] TA   [] Neuro   [] Self Care Patient Education: [x] Review HEP    [] Progressed/Changed HEP based on:   [] positioning   [] body mechanics   [] transfers   [] heat/ice application    [] other:      Other Objective/Functional Measures:    See flowsheet for drills, loads and volume     Post Treatment Pain Level (on 0 to 10) scale:   0  / 10     ASSESSMENT  Assessment/Changes in Function:     Performed Rhomberg on foam with EC 2 x 30 seconds. Close supervision for safety.      []  See Progress Note/Recertification   Patient will continue to benefit from skilled PT services to modify and progress therapeutic interventions, address functional mobility deficits, address ROM deficits, address strength deficits, analyze and address soft tissue restrictions, analyze and cue movement patterns, analyze and modify body mechanics/ergonomics and assess and modify postural abnormalities to attain remaining goals. Progress toward goals / Updated goals:     · Goals for this certification period include and are to be achieved in   1  weeks:  2) Increase FOTO to 66/100 indicating improved function and quality of life. Status: 59/100 (was: 59/100) -DC this GOAL: Pt reports a 5+ on Global Rating of Change (GROC). 3) Pt to deny falls over past 4 weeks.        PLAN  [x]  Upgrade activities as tolerated yes Continue plan of care   []  Discharge due to :    []  Other: 1x/wk visits     Therapist: Jesús Rehman PTA DPT [facility]    Date: 2/7/2022 Time: 7:17 AM     Future Appointments   Date Time Provider Ayaka Farnsworth   2/7/2022  1:00 PM Rasta Bloodgood [address] [facility]   2/21/2022  1:00 PM Carlyn Coates, PT [address]   3/7/2022  1:00 PM Carlyn Coates, PT [address]   3/14/2022  1:00 PM Carlyn Coates  [address] [facility]   3/21/2022  1:00 PM Carlyn Coates  [address] [facility]   3/28/2022  1:00 PM Gavino Johnson, PT [address]

## 2022-02-14 ENCOUNTER — APPOINTMENT (OUTPATIENT)
Dept: PHYSICAL THERAPY | Age: 47
End: 2022-02-14
Payer: MEDICARE

## 2022-02-21 ENCOUNTER — HOSPITAL ENCOUNTER (OUTPATIENT)
Dept: PHYSICAL THERAPY | Age: 47
Discharge: HOME | End: 2022-02-21
Payer: MEDICARE

## 2022-02-21 PROCEDURE — 97110 THERAPEUTIC EXERCISES: CPT

## 2022-02-21 PROCEDURE — 97112 NEUROMUSCULAR REEDUCATION: CPT

## 2022-02-21 PROCEDURE — 97530 THERAPEUTIC ACTIVITIES: CPT

## 2022-02-21 NOTE — PROGRESS NOTES
PHYSICAL THERAPY - DAILY TREATMENT NOTE    Patient Name: Lukas Martinez        Date: 2022  : 1975   yes Patient  Verified  Visit #:   10   of   12  Insurance: Payor: Moises Marie / Plan: [facility] HUMANA MEDICARE CHOICE PPO/PFFS / Product Type: Managed Care Medicare /      In time: 12:55 Out time: 1:40   Total Treatment Time: 45     Medicare/[facility] Time Tracking (below)   Total Timed Codes (min):  45 1:1 Treatment Time:  45     TREATMENT AREA =  Pain in left knee [M25.562]    SUBJECTIVE  Pain Level (on 0 to 10 scale): 0  / 10   Medication Changes/New allergies or changes in medical history, any new surgeries or procedures?    no  If yes, update Summary List   Subjective Functional Status/Changes:  []  No changes reported     No new complaints a this time. Wearing his knee brace on the left knee and this is helping. OBJECTIVE  20 min Therapeutic Exercise:  [x]  See flow sheet   Rationale:      increase ROM and increase strength to improve the patients ability to perform walking activities. 10 min Therapeutic Activity: [x]  See flow sheet   Rationale:    increase strength and improve coordination to improve the patients ability to transfer and step    15 min Neuromuscular Re-ed: [x]  See flow sheet   Rationale:    improve coordination, improve balance and increase proprioception to improve the patients ability to ambulate on uneven and even surfaces. Billed With/As:   [x] TE   [] TA   [] Neuro   [] Self Care Patient Education: [x] Review HEP    [] Progressed/Changed HEP based on:   [] positioning   [] body mechanics   [] transfers   [] heat/ice application    [] other:      Other Objective/Functional Measures:    See flowsheet for drills, loads and volume     Post Treatment Pain Level (on 0 to 10) scale:   0  / 10     ASSESSMENT  Assessment/Changes in Function:     Step up causing pain on unstable surface due to ankle pronation, knee valgus and hip ADD causing knee load.  Worked lateral hip and medial ankle drills in session. Along with functional strength and stepping. Pt has scheduled out past our agreed upon DC date of 3/14/22. Will address expectations NV.     []  See Progress Note/Recertification   Patient will continue to benefit from skilled PT services to modify and progress therapeutic interventions, address functional mobility deficits, address ROM deficits, address strength deficits, analyze and address soft tissue restrictions, analyze and cue movement patterns, analyze and modify body mechanics/ergonomics and assess and modify postural abnormalities to attain remaining goals. Progress toward goals / Updated goals:     · Goals for this certification period include and are to be achieved in   1  weeks:  2) Increase FOTO to 66/100 indicating improved function and quality of life. Status: 59/100 (was: 59/100) -DC this GOAL: Pt reports a 5+ on Global Rating of Change (GROC). 3) Pt to deny falls over past 4 weeks.        PLAN  [x]  Upgrade activities as tolerated yes Continue plan of care   []  Discharge due to :    []  Other: 1x/wk visits     Therapist: Michelle Mir PT DPT [facility]    Date: 2/21/2022 Time: 7:17 AM     Future Appointments   Date Time Provider Ayaka Farnsworth   2/21/2022  1:00 PM Anderson Montoya, [address]   3/7/2022  1:00 PM Anderson Montoya, [address]   3/14/2022  9:30 AM Anderson Montoya, PT [address]SH[address] SAN[address]   3/21/2022  1:00 PM FLORINDA Fox [address]   3/28/2022  1:00 PM Rosa Johnson, [address]

## 2022-02-28 ENCOUNTER — APPOINTMENT (OUTPATIENT)
Dept: PHYSICAL THERAPY | Age: 47
End: 2022-02-28
Payer: MEDICARE

## 2022-03-07 ENCOUNTER — HOSPITAL ENCOUNTER (OUTPATIENT)
Dept: PHYSICAL THERAPY | Age: 47
Discharge: HOME | End: 2022-03-07
Payer: MEDICARE

## 2022-03-07 PROCEDURE — 97110 THERAPEUTIC EXERCISES: CPT

## 2022-03-07 PROCEDURE — 97530 THERAPEUTIC ACTIVITIES: CPT

## 2022-03-07 PROCEDURE — 97112 NEUROMUSCULAR REEDUCATION: CPT

## 2022-03-07 PROCEDURE — 97140 MANUAL THERAPY 1/> REGIONS: CPT

## 2022-03-07 NOTE — PROGRESS NOTES
PHYSICAL THERAPY - DAILY TREATMENT NOTE    Patient Name: Spencer Dempsey        Date: 3/7/2022  : 1975   yes Patient  Verified  Visit #:     Insurance: Payor: Moshe Schultz / Plan: [facility] HUMAN MEDICARE CHOICE PPO/PFFS / Product Type: Managed Care Medicare /      In time: 1:00 Out time: 2:00   Total Treatment Time: 60     Medicare/[facility] Time Tracking (below)   Total Timed Codes (min):  60 1:1 Treatment Time:  60     TREATMENT AREA =  Pain in left knee [M25.562]    SUBJECTIVE  Pain Level (on 0 to 10 scale): 0  / 10   Medication Changes/New allergies or changes in medical history, any new surgeries or procedures?    no  If yes, update Summary List   Subjective Functional Status/Changes:  []  No changes reported     Says the left knee is \"fine\" unless he uses it or walks on it a while bunch. Says he will see his Physiatrist in July and in March have a telemed call with dr Tj Baca. Pt's wife reports he could ask for more therapy. OBJECTIVE  25 min Therapeutic Exercise:  [x]  See flow sheet   Rationale:      increase ROM and increase strength to improve the patients ability to perform walking activities. 10 min Therapeutic Activity: [x]  See flow sheet   Rationale:    increase strength and improve coordination to improve the patients ability to transfer and step    15 min Neuromuscular Re-ed: [x]  See flow sheet   Rationale:    improve coordination, improve balance and increase proprioception to improve the patients ability to ambulate on uneven and even surfaces. 10 min Manual Therapy: Hip PROM, bilat   Rationale:      decrease pain, increase ROM and increase tissue extensibility to improve patient's ability to improve hip available motion  The manual therapy interventions were performed at a separate and distinct time from the therapeutic activities interventions.       Billed With/As:   [x] TE   [] TA   [] Neuro   [] Self Care Patient Education: [x] Review HEP    [] Progressed/Changed HEP based on:   [] positioning   [] body mechanics   [] transfers   [] heat/ice application    [] other:      Other Objective/Functional Measures:    See flowsheet for drills, loads and volume  Structural and dynamic genu valgus with left ankle prontation and left hip ADD in stance. Post Treatment Pain Level (on 0 to 10) scale:   0  / 10     ASSESSMENT  Assessment/Changes in Function:     Pt continues to have difficulty due to both structural and dynamic valgus of the left knee. Soft brace not supportive. I recommend pt seek  brace (Ossur or similar). Pt does report he has had something like this before and found it difficult to don/doff/bulky. Pt has met max benefit and will DC NV to HEP and brace recommendation. This POC is moderately agreed upon. I have been describing current status and progress. Pt's wife does continue to report she wishes pt to complete PT. Discussed DC to HEP and brace recommendation for Next visit. Pt and pt's wife verbalize understanding. []  See Progress Note/Recertification   Patient will continue to benefit from skilled PT services to modify and progress therapeutic interventions, address functional mobility deficits, address ROM deficits, address strength deficits, analyze and address soft tissue restrictions, analyze and cue movement patterns, analyze and modify body mechanics/ergonomics and assess and modify postural abnormalities to attain remaining goals. Progress toward goals / Updated goals:     · Goals for this certification period include and are to be achieved in   1  weeks:  2) Increase FOTO to 66/100 indicating improved function and quality of life. Status: 59/100 (was: 59/100) -DC this GOAL: Pt reports a 5+ on Global Rating of Change (GROC). 3) Pt to deny falls over past 4 weeks.        PLAN  [x]  Upgrade activities as tolerated yes Continue plan of care   []  Discharge due to :    []  Other: 1x/wk visits     Therapist: Philippe Quevedo Elizabeth, FLORINDA DPT [facility]    Date: 3/7/2022 Time: 7:17 AM     Future Appointments   Date Time Provider Ayaka Daja   3/14/2022  9:30 AM [address], PT [facility] [address]   3/21/2022  1:00 PM [address], FLORINDA [facility] [address]   3/28/2022  1:00 PM Fiorella Johnson, PT [place] [address]

## 2022-03-14 ENCOUNTER — APPOINTMENT (OUTPATIENT)
Dept: PHYSICAL THERAPY | Age: 47
End: 2022-03-14
Payer: MEDICARE

## 2022-03-14 ENCOUNTER — HOSPITAL ENCOUNTER (OUTPATIENT)
Dept: PHYSICAL THERAPY | Age: 47
Discharge: HOME | End: 2022-03-14
Payer: MEDICARE

## 2022-03-14 PROCEDURE — 97535 SELF CARE MNGMENT TRAINING: CPT

## 2022-03-14 NOTE — PROGRESS NOTES
PHYSICAL THERAPY - DAILY TREATMENT NOTE    Patient Name: Kate Jorgensen        Date: 3/14/2022  : 1975   yes Patient  Verified  Visit #:     Insurance: Payor: Junior Yao / Plan: [facility] HUMAN MEDICARE CHOICE PPO/PFFS / Product Type: Managed Care Medicare /      In time: 9:25 Out time: 9:45   Total Treatment Time: 20     Medicare/[facility] Time Tracking (below)   Total Timed Codes (min):  20 1:1 Treatment Time:  20     TREATMENT AREA =  Pain in left knee [M25.562]    SUBJECTIVE  Pain Level (on 0 to 10 scale): 0  / 10   Medication Changes/New allergies or changes in medical history, any new surgeries or procedures?    no  If yes, update Summary List   Subjective Functional Status/Changes:  []  No changes reported     Says the knee has not been cracking as much. Says good days and bad days. Reports the pain has subsided overall. Denies falls/LOB. OBJECTIVE  20 min Self Care: Activity modification, Pt Ed, DC planning   Rationale:    improve coordination to improve the patients ability to make gains post DC      Billed With/As:   [x] TE   [] TA   [] Neuro   [] Self Care Patient Education: [x] Review HEP    [] Progressed/Changed HEP based on:   [] positioning   [] body mechanics   [] transfers   [] heat/ice application    [] other:      Other Objective/Functional Measures:    See flowsheet for drills, loads and volume  Structural valgus of 12 deg on left LE    Left LE is 5/5 MMT except 5-/5 hip ABD/Ext. Right LE is 4+/5, tone a factor. Post Treatment Pain Level (on 0 to 10) scale:   0  / 10     ASSESSMENT  Assessment/Changes in Function:      Pt has met max benefit and will DC NV to HEP and brace recommendation. Pt continues to have difficulty due to both structural and dynamic valgus of the left knee. Soft brace not supportive. I recommend pt seek  brace (Ossur or similar).  Pt and I discussed his current status and options and both parties agreeable to DC to gym routine and pool and brace prn.      []  See Progress Note/Recertification   Patient will continue to benefit from skilled PT services to modify and progress therapeutic interventions, address functional mobility deficits, address ROM deficits, address strength deficits, analyze and address soft tissue restrictions, analyze and cue movement patterns, analyze and modify body mechanics/ergonomics and assess and modify postural abnormalities to attain remaining goals. Progress toward goals / Updated goals:     · Goals for this certification period include and are to be achieved in   1  weeks:  2) Increase FOTO to 66/100 indicating improved function and quality of life. Status: 59/100 (was: 59/100) -DC this GOAL: Pt reports a 5+ on Global Rating of Change (GROC). 3) Pt to deny falls over past 4 weeks. MET       PLAN  []  Upgrade activities as tolerated yes Continue plan of care   [x]  Discharge due to :  Max benefit   []  Other:      Therapist: Agustín Johnson, PT DPT [facility]    Date: 3/14/2022 Time: 7:17 AM     Future Appointments   Date Time Provider Ayaka Farnsworth   3/14/2022  9:30 AM Chayo Gonzales PT [facility]   3/21/2022  1:00 PM Chayo Gonzales PT [facility]   3/28/2022  1:00 PM Allie Johnson, [place] [address]

## 2022-03-14 NOTE — PROGRESS NOTES
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 Medications: Verified on Patient Summary List   Visits from [facility]: 12 Missed Visits: -       Subjective: Says the knee has not been cracking as much. Says good days and bad days. Reports the pain has subsided overall. Denies falls/LOB. Objective: Structural valgus of 12 deg on left LE     Left LE is 5/5 MMT except 5-/5 hip ABD/Ext. Right LE is 4+/5, tone a factor. · Goals for this certification period include and are to be achieved in   1  weeks:  2) Increase FOTO to 66/100 indicating improved function and quality of life. Status: 59/100 (was: 59/100) -DC this GOAL: Pt reports a 5+ on Global Rating of Change (GROC). 3) Pt to deny falls over past 4 weeks. MET    Key Functional Changes/Progress:  Pt has met max benefit and will DC NV to HEP and brace recommendation. Pt continues to have difficulty due to both structural and dynamic valgus of the left knee. Soft brace not supportive. I recommend pt seek  brace (Ossur or similar). Pt and I discussed his current status and options and both parties agreeable to DC to gym routine and pool and brace prn. Assessments/Recommendations: Discontinue therapy. Progressing towards or have reached established goals. If you have any questions/comments please contact us directly at [phone].    Thank you for allowing us to assist in the care of your patient. Therapist Signature: Franco Jonas PT Date: 3/14/22   Reporting Period: 1/17/22 - 3/14/22 Time: 9:48 AM   NOTE TO PHYSICIAN:  Your patient's insurance requires this discharge note be signed and returned. PLEASE COMPLETE THE ORDERS BELOW AND RETURN TO:  [facility] PHYSICAL THERAPY    ___ I have read the above report and request that my patient be discharged from therapy.      Physician Signature:        Date:       Time:    Claire Rankin, *

## 2022-03-18 PROBLEM — Z87.820 HISTORY OF TRAUMATIC BRAIN INJURY: Status: ACTIVE | Noted: 2017-12-19

## 2022-03-18 PROBLEM — S46.212A RUPTURE OF LEFT BICEPS TENDON: Status: ACTIVE | Noted: 2017-04-18

## 2022-03-18 PROBLEM — M48.061 FORAMINAL STENOSIS OF LUMBAR REGION: Status: ACTIVE | Noted: 2017-04-18

## 2022-03-19 PROBLEM — M48.00 CENTRAL STENOSIS OF SPINAL CANAL: Status: ACTIVE | Noted: 2017-04-18

## 2022-03-21 ENCOUNTER — APPOINTMENT (OUTPATIENT)
Dept: PHYSICAL THERAPY | Age: 47
End: 2022-03-21
Payer: MEDICARE

## 2022-03-28 ENCOUNTER — APPOINTMENT (OUTPATIENT)
Dept: PHYSICAL THERAPY | Age: 47
End: 2022-03-28
Payer: MEDICARE

## 2023-01-31 RX ORDER — NEFAZODONE HYDROCHLORIDE 200 MG/1
TABLET ORAL 2 TIMES DAILY
COMMUNITY

## 2023-01-31 RX ORDER — BACLOFEN 10 MG/1
10 TABLET ORAL 2 TIMES DAILY
COMMUNITY

## 2023-01-31 RX ORDER — TRIHEXYPHENIDYL HYDROCHLORIDE 2 MG/1
TABLET ORAL 2 TIMES DAILY
COMMUNITY

## 2023-01-31 RX ORDER — SENNOSIDES 8.6 MG
650 CAPSULE ORAL EVERY 8 HOURS
COMMUNITY

## 2023-01-31 RX ORDER — IBUPROFEN 200 MG
200 TABLET ORAL EVERY 6 HOURS PRN
COMMUNITY

## 2023-01-31 RX ORDER — PSEUDOEPHEDRINE HCL 30 MG
100 TABLET ORAL DAILY
COMMUNITY

## 2023-01-31 RX ORDER — TOPIRAMATE 25 MG/1
12.5 TABLET ORAL ONCE
COMMUNITY

## 2023-09-07 NOTE — MR AVS SNAPSHOT
[address]  [address] [address] [facility] [id number] [phone] Patient: Aysa Ibrahim MRN: [id number] RIG:[id number]09[id number] Visit Information Date & Time Provider Department Dept. Phone Encounter #  
 1/18/2018 11:15 AM Dalila Hartman [place] [phone] [id number] Upcoming Health Maintenance Date Due DTaP/Tdap/Td series (1 - Tdap) 6/14/1996 Allergies as of 1/18/2018  Review Complete On: 1/18/2018 By: Sylvie Dupree MD  
 No Known Allergies Current Immunizations  Never Reviewed Name Date Influenza Vaccine 9/1/2017 Not reviewed this visit You Were Diagnosed With   
  
 Codes Comments Hearing loss of left ear, unspecified hearing loss type    -  Primary ICD-10-CM: H91.92 
ICD-9-CM: 389.9 Bilateral impacted cerumen     ICD-10-CM: H61.23 
ICD-9-CM: 380.4 Vitals BP Pulse Temp Resp Height(growth percentile) Weight(growth percentile) 121/80 (BP 1 Location: Left arm, BP Patient Position: Sitting) 75 96.6 °F (35.9 °C) (Oral) 20 6' (1.829 m) 182 lb (82.6 kg) SpO2 BMI Smoking Status 97% 24.68 kg/m2 Never Smoker Vitals History BMI and BSA Data Body Mass Index Body Surface Area  
 24.68 kg/m 2 2.05 m 2 Preferred Pharmacy Pharmacy Name Phone [address] [phone] Your Updated Medication List  
  
   
This list is accurate as of: 1/18/18 11:23 AM.  Always use your most recent med list.  
  
  
  
  
 baclofen 10 mg tablet Commonly known as:  LIORESAL Take  by mouth four (4) times daily. carbamide peroxide 6.5 % otic solution Commonly known as:  Arsenio [place] Administer 5 Drops into each ear two (2) times a day for 4 days. ibuprofen 800 mg tablet Commonly known as:  MOTRIN  
 Take 1 Tab by mouth every eight (8) hours as needed for Pain. INNOPRAN  mg Cp24 Generic drug:  propranolol Take  by mouth. TOPAMAX 25 mg tablet Generic drug:  topiramate Take 12.5 mg by mouth once. trihexyphenidyl 2 mg tablet Commonly known as:  ARTANE Take  by mouth two (2) times a day. Prescriptions Sent to Pharmacy Refills  
 carbamide peroxide (DEBROX) 6.5 % otic solution 0 Sig: Administer 5 Drops into each ear two (2) times a day for 4 days. Class: Normal  
 Pharmacy: [facility] [address] [facility] [address]  #: [phone] Route: Both Ears We Performed the Following REFERRAL TO ENT-OTOLARYNGOLOGY [LHX45 Custom] Referral Information Referral ID Referred By Referred To  
  
 [id number] Joann Gaytan MD   
   [address], [address] [place] [address] Phone: [phone] Fax: [phone] Visits Status Start Date End Date 1 New Request 1/18/18 1/18/19 If your referral has a status of pending review or denied, additional information will be sent to support the outcome of this decision. Introducing [facility]! [employer] introduces Odeo patient portal. Now you can access parts of your medical record, email your doctor's office, and request medication refills online. 1. In your internet browser, go to [email or web address] 2. Click on the First Time User? Click Here link in the Sign In box. You will see the New Member Sign Up page. 3. Enter your Odeo Access Code exactly as it appears below. You will not need to use this code after youve completed the sign-up process. If you do not sign up before the expiration date, you must request a new code. · Odeo Access Code: [email or web address] Expires: 4/18/2018 11:23 AM 
 
 4. Enter the last four digits of your Social Security Number (xxxx) and Date of Birth (mm/dd/yyyy) as indicated and click Submit. You will be taken to the next sign-up page. 5. Create a Endoart ID. This will be your Endoart login ID and cannot be changed, so think of one that is secure and easy to remember. 6. Create a Endoart password. You can change your password at any time. 7. Enter your Password Reset Question and Answer. This can be used at a later time if you forget your password. 8. Enter your e-mail address. You will receive e-mail notification when new information is available in [address] 9. Click Sign Up. You can now view and download portions of your medical record. 10. Click the Download Summary menu link to download a portable copy of your medical information. If you have questions, please visit the Frequently Asked Questions section of the Endoart website. Remember, Endoart is NOT to be used for urgent needs. For medical emergencies, dial 911. Now available from your iPhone and Android! Please provide this summary of care documentation to your next provider. Your primary care clinician is listed as Kade 13. If you have any questions after today's visit, please call [phone]. 07-Sep-2023 18:31

## 2024-12-16 ENCOUNTER — APPOINTMENT (OUTPATIENT)
Facility: HOSPITAL | Age: 49
End: 2024-12-16
Payer: MEDICARE

## 2024-12-16 ENCOUNTER — HOSPITAL ENCOUNTER (OUTPATIENT)
Facility: HOSPITAL | Age: 49
Setting detail: RECURRING SERIES
Discharge: HOME | End: 2024-12-19
Payer: MEDICARE

## 2024-12-16 ENCOUNTER — HOSPITAL ENCOUNTER (OUTPATIENT)
Facility: HOSPITAL | Age: 49
Setting detail: RECURRING SERIES
End: 2024-12-16
Payer: MEDICARE

## 2024-12-16 PROCEDURE — 97163 PT EVAL HIGH COMPLEX 45 MIN: CPT

## 2024-12-16 NOTE — PROGRESS NOTES
PT services to  modify and progress therapeutic interventions, analyze and cue for proper movement patterns, analyze and modify for postural abnormalities, and analyze and address imbalance/dizziness to address functional deficits and attain remaining goals.      Progress toward goals / Updated goals:  []  See Progress Note/Recertification    SEE TERRENCE    Next PN / RC due: 1/16/25  Auth due: requested today    PLAN  YES Continue plan of care  [x]  Upgrade activities as tolerated  []  Discharge due to :  []  Other:    Ignacio Pham, PT DPT, OCS, Cert-DN   12/16/2024    11:50 AM    *If an interpreting service was utilized for treatment of this patient, the contents of this document represent the material reviewed with the patient via the .     No future appointments.

## 2024-12-16 NOTE — THERAPY EVALUATION
SRIKANTH HARRISON [facility] - [facility]  [address], [address], [place] [address] Ph:[phone] Fx: [phone]  Plan of Care / Statement of Necessity for Physical Therapy Services     Patient Name: Marquez Walker : 1975   Medical   Diagnosis: Unsteadiness on feet [R26.81]  Ataxic gait [R26.0] Treatment Diagnosis: R26.89  Abnormalities of gait and mobility and R26.81  Unsteadiness on feet    Onset Date: Chronic decline, reports worse \"lately\"     Referral Source: Perlman, Owen, MD Start of Care (SOC): 2024   Prior Hospitalization: See medical history Provider #: [id number]   Prior Level of Function: Chronic mobility deficits   Comorbidities:  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 Arthritis, heart disease, HTN.   Subjective: Pt reports Hx of falling and fracturing left fibula and tibia . Says has pain in the legs since . Pt relays he goes to the [employer] and swims 40 laps 2x/wk. He also uses the leg press and hip ABD machine. Reports he has been trying to stand and he feels when his legs get too close together and he looses balance. He is falling in the house. Says uses a walker in the home and uses a cane in community.   Wants to work on strengthening the legs, He notes his father thinks his legs have atrophied. He feels like even through he works out it's not seemingly helping for some reason.   He is taking his lamotrigine tablets (helps him be less agitated). He sometimes wears a knee brace on the left knee. He has stairs in the house but his wife doesn't want him on them.     Assessment / key information:  Pt presents to [facility] with signs and symptoms congruent with a diagnosis of gait instability and LE weakness in light of chronic neurologic deficits, left knee injury and genu valgus. This affects his safety in the home and community, his walking and standing  - - -

## 2024-12-20 ENCOUNTER — HOSPITAL ENCOUNTER (OUTPATIENT)
Facility: HOSPITAL | Age: 49
Setting detail: RECURRING SERIES
Discharge: HOME | End: 2024-12-23
Payer: MEDICARE

## 2024-12-20 PROCEDURE — 97530 THERAPEUTIC ACTIVITIES: CPT

## 2024-12-20 PROCEDURE — 97110 THERAPEUTIC EXERCISES: CPT

## 2024-12-20 NOTE — PROGRESS NOTES
MCGUIRE score to 40/56 indicating improved function in daily tasks and safety in home. (32/56 at Eval)  2.  Pt to indicate a Global Rating Of Change (GROC) of 4+ indicating \"moderately better\"  3. Pt to deny falls over the past 7 weeks to indicate improved safety and reduced falls risk      PLAN  YES Continue plan of care  [x]  Upgrade activities as tolerated  []  Discharge due to :  []  Other:    Ignacio Pham, PT DPT, OCS, Cert DN   12/20/2024    7:03 AM    *If an interpreting service was utilized for treatment of this patient, the contents of this document represent the material reviewed with the patient via the .     Future Appointments   Date Time Provider Department Center   12/20/2024 11:40 AM Ignacio Pham PT [facility]   12/24/2024 11:00 AM Femi Riley PT [facility]   12/26/2024 11:40 AM [facility]   12/31/2024 11:00 AM Ignacio Pham PT [facility]   1/2/2025 11:00 AM [facility]   1/7/2025 11:00 AM [facility]   1/9/2025 11:00 AM [facility]

## 2024-12-24 ENCOUNTER — HOSPITAL ENCOUNTER (OUTPATIENT)
Facility: HOSPITAL | Age: 49
Setting detail: RECURRING SERIES
Discharge: HOME | End: 2024-12-27
Payer: MEDICARE

## 2024-12-24 PROCEDURE — 97530 THERAPEUTIC ACTIVITIES: CPT

## 2024-12-24 PROCEDURE — 97112 NEUROMUSCULAR REEDUCATION: CPT

## 2024-12-24 NOTE — PROGRESS NOTES
dry needle or estim unattended, both untimed codes, in totals to left)  8-22 min = 1 unit; 23-37 min = 2 units; 38-52 min = 3 units; 53-67 min = 4 units; 68-82 min = 5 units   Total Total     [x]  Patient Education billed concurrently with other procedures   [x] Review HEP    [] Progressed/Changed HEP, detail:    [] Other detail:       Objective Information/Functional Measures/Assessment    Balance f/b functional training. Guarding throughout for safety. Better time with step taps on second set which was done after sled push, squats and sit to stands. First set done prior with more spasticity noted.    Patient will continue to benefit from skilled PT services to modify and progress therapeutic interventions, analyze and address functional mobility deficits, analyze and address ROM deficits, analyze and address strength deficits, analyze and address soft tissue restrictions, analyze and cue for proper movement patterns, and analyze and modify for postural abnormalities to address functional deficits and attain remaining goals.    Progress toward goals / Updated goals:  []  See Progress Note/Recertification  Next PN / RC due: 1/16/25  Auth due: 16 v thru 2/16/25    Short Term Goals: To be accomplished in 4 weeks  Pt to report adherence and demonstrate compliance with basic HEP to allow progress between visits Current: Doing his gym routine (12/20/24)  2.  Pt to improve FGA to 15/30 to allow improved function and QOL (7/30 at [facility])  3. Pt to deny falls over the past 3 weeks to indicate improved safety and reduced falls risk no falls this week 12/24/24     Long Term Goals: To be accomplished in 8 weeks  Pt to improve MCGUIRE score to 40/56 indicating improved function in daily tasks and safety in home. (32/56 at [facility])  2.  Pt to indicate a Global Rating Of Change (GROC) of 4+ indicating \"moderately better\"  3. Pt to deny falls over the past 7 weeks to indicate improved safety and reduced falls risk      PLAN  YES Continue

## 2024-12-26 ENCOUNTER — HOSPITAL ENCOUNTER (OUTPATIENT)
Facility: HOSPITAL | Age: 49
Setting detail: RECURRING SERIES
Discharge: HOME | End: 2024-12-29
Payer: MEDICARE

## 2024-12-26 PROCEDURE — 97110 THERAPEUTIC EXERCISES: CPT

## 2024-12-26 PROCEDURE — 97530 THERAPEUTIC ACTIVITIES: CPT

## 2024-12-26 NOTE — PROGRESS NOTES
PHYSICAL THERAPY - DAILY TREATMENT NOTE    Patient Name: Marquez Walker    Date: 2024    : 1975  Insurance: Payor: [employer] MEDICARE / Plan: [employer] CHOICE-PPO MEDICARE / Product Type: *No Product type* /      Patient  verified YES   Visit #   Current / Total 4 16   Time   In / Out 7:40 8:20   Pain   In / Out 1 0   Subjective Functional Status/Changes:   Says the knee was bothering him a little later that night after the last session.       TREATMENT AREA =  Unsteadiness on feet [R26.81]  Ataxic gait [R26.0]    OBJECTIVE    Therapeutic Procedures:  Tx Min Billable or 1:1 Min (if diff from Tx Min) Procedure, Rationale, Specifics     23419 Neuromuscular Re-Education (timed):  improve balance, coordination, kinesthetic sense, posture, core stability and proprioception to improve patient's ability to develop conscious control of individual muscles and awareness of position of extremities in order to progress to PLOF and address remaining functional goals. (see flow sheet as applicable)     Details if applicable:       25  03318 Therapeutic Activity (timed):  use of dynamic activities replicating functional movements to increase ROM, strength, coordination, balance, and proprioception in order to improve patient's ability to progress to PLOF and address remaining functional goals.  (see flow sheet as applicable)     Details if applicable:     15  00618 Therapeutic Exercise (timed):  increase ROM, strength, coordination, balance, and proprioception to improve patient's ability to progress to PLOF and address remaining functional goals. (see flow sheet as applicable)     Details if applicable:            Details if applicable:            Details if applicable:     40  [facility] Totals Reminder: bill using total billable min of TIMED therapeutic procedures (example: do not include dry needle or estim unattended, both untimed codes, in totals to left)  8-22 min = 1 unit; 23-37 min = 2 units; 38-52 min = 3 units;

## 2024-12-30 ENCOUNTER — HOSPITAL ENCOUNTER (OUTPATIENT)
Facility: HOSPITAL | Age: 49
Setting detail: RECURRING SERIES
Discharge: HOME | End: 2025-01-02
Payer: MEDICARE

## 2024-12-30 PROCEDURE — 97110 THERAPEUTIC EXERCISES: CPT

## 2024-12-30 PROCEDURE — 97530 THERAPEUTIC ACTIVITIES: CPT

## 2024-12-30 PROCEDURE — 97112 NEUROMUSCULAR REEDUCATION: CPT

## 2024-12-30 NOTE — PROGRESS NOTES
tolerated  []  Discharge due to :  []  Other:    Ignacio Pham, NURIS DPT, Cert DN   12/30/2024    6:37 AM    *If an interpreting service was utilized for treatment of this patient, the contents of this document represent the material reviewed with the patient via the .     Future Appointments   Date Time Provider Department Center   12/30/2024  7:00 AM Ignacio Pham, PT [facility]   1/8/2025  7:00 AM Ignacio Pham, PT [facility]   1/10/2025  7:00 AM Ignacio Pham, PT [facility]   1/13/2025  7:40 AM [facility]

## 2024-12-31 ENCOUNTER — APPOINTMENT (OUTPATIENT)
Facility: HOSPITAL | Age: 49
End: 2024-12-31
Payer: MEDICARE

## 2025-01-02 ENCOUNTER — APPOINTMENT (OUTPATIENT)
Facility: HOSPITAL | Age: 50
End: 2025-01-02
Payer: MEDICARE

## 2025-01-03 ENCOUNTER — HOSPITAL ENCOUNTER (OUTPATIENT)
Facility: HOSPITAL | Age: 50
Setting detail: RECURRING SERIES
Discharge: HOME | End: 2025-01-06
Payer: MEDICARE

## 2025-01-03 PROCEDURE — 97110 THERAPEUTIC EXERCISES: CPT

## 2025-01-03 PROCEDURE — 97530 THERAPEUTIC ACTIVITIES: CPT

## 2025-01-03 PROCEDURE — 97112 NEUROMUSCULAR REEDUCATION: CPT

## 2025-01-03 NOTE — PROGRESS NOTES
PHYSICAL THERAPY - DAILY TREATMENT NOTE    Patient Name: Marquez Walker    Date: 1/3/2025    : 1975  Insurance: Payor: [employer] MEDICARE / Plan: [employer] CHOICE-PPO MEDICARE / Product Type: *No Product type* /      Patient  verified YES   Visit #   Current / Total 6 16   Time   In / Out 7:40 8:40   Pain   In / Out 0 0   Subjective Functional Status/Changes:   Says he wants to do leg press today. He swam this week. He does 40 laps.        TREATMENT AREA =  Unsteadiness on feet [R26.81]  Ataxic gait [R26.0]    OBJECTIVE    Therapeutic Procedures:  Tx Min Billable or 1:1 Min (if diff from Tx Min) Procedure, Rationale, Specifics   10  61306 Neuromuscular Re-Education (timed):  improve balance, coordination, kinesthetic sense, posture, core stability and proprioception to improve patient's ability to develop conscious control of individual muscles and awareness of position of extremities in order to progress to PLOF and address remaining functional goals. (see flow sheet as applicable)     Details if applicable:       40  77583 Therapeutic Activity (timed):  use of dynamic activities replicating functional movements to increase ROM, strength, coordination, balance, and proprioception in order to improve patient's ability to progress to PLOF and address remaining functional goals.  (see flow sheet as applicable)     Details if applicable:     10  84377 Therapeutic Exercise (timed):  increase ROM, strength, coordination, balance, and proprioception to improve patient's ability to progress to PLOF and address remaining functional goals. (see flow sheet as applicable)     Details if applicable:            Details if applicable:            Details if applicable:     60  [facility] Totals Reminder: bill using total billable min of TIMED therapeutic procedures (example: do not include dry needle or estim unattended, both untimed codes, in totals to left)  8-22 min = 1 unit; 23-37 min = 2 units; 38-52 min = 3 units; 53-67

## 2025-01-07 ENCOUNTER — APPOINTMENT (OUTPATIENT)
Facility: HOSPITAL | Age: 50
End: 2025-01-07
Payer: MEDICARE

## 2025-01-08 ENCOUNTER — HOSPITAL ENCOUNTER (OUTPATIENT)
Facility: HOSPITAL | Age: 50
Setting detail: RECURRING SERIES
Discharge: HOME | End: 2025-01-11
Payer: MEDICARE

## 2025-01-08 ENCOUNTER — APPOINTMENT (OUTPATIENT)
Facility: HOSPITAL | Age: 50
End: 2025-01-08
Payer: MEDICARE

## 2025-01-08 PROCEDURE — 97110 THERAPEUTIC EXERCISES: CPT

## 2025-01-08 PROCEDURE — 97112 NEUROMUSCULAR REEDUCATION: CPT

## 2025-01-08 PROCEDURE — 97530 THERAPEUTIC ACTIVITIES: CPT

## 2025-01-08 NOTE — PROGRESS NOTES
PHYSICAL THERAPY - DAILY TREATMENT NOTE    Patient Name: Marquez Walker    Date: 2025    : 1975  Insurance: Payor: HU[employer] MEDICARE / Plan: [employer] CHOICE-PPO MEDICARE / Product Type: *No Product type* /      Patient  verified YES   Visit #   Current / Total 7 16   Time   In / Out 9:00 9:40   Pain   In / Out 0 0   Subjective Functional Status/Changes:   The knee was a little sore after last time. The squats are helping he thinks.        TREATMENT AREA =  Unsteadiness on feet [R26.81]  Ataxic gait [R26.0]    OBJECTIVE    Therapeutic Procedures:  Tx Min Billable or 1:1 Min (if diff from Tx Min) Procedure, Rationale, Specifics   10  16470 Neuromuscular Re-Education (timed):  improve balance, coordination, kinesthetic sense, posture, core stability and proprioception to improve patient's ability to develop conscious control of individual muscles and awareness of position of extremities in order to progress to PLOF and address remaining functional goals. (see flow sheet as applicable)     Details if applicable:         52063 Therapeutic Activity (timed):  use of dynamic activities replicating functional movements to increase ROM, strength, coordination, balance, and proprioception in order to improve patient's ability to progress to PLOF and address remaining functional goals.  (see flow sheet as applicable)     Details if applicable:     10  25828 Therapeutic Exercise (timed):  increase ROM, strength, coordination, balance, and proprioception to improve patient's ability to progress to PLOF and address remaining functional goals. (see flow sheet as applicable)     Details if applicable:            Details if applicable:            Details if applicable:     40  [facility] Totals Reminder: bill using total billable min of TIMED therapeutic procedures (example: do not include dry needle or estim unattended, both untimed codes, in totals to left)  8-22 min = 1 unit; 23-37 min = 2 units; 38-52 min = 3 units;

## 2025-01-09 ENCOUNTER — APPOINTMENT (OUTPATIENT)
Facility: HOSPITAL | Age: 50
End: 2025-01-09
Payer: MEDICARE

## 2025-01-10 ENCOUNTER — HOSPITAL ENCOUNTER (OUTPATIENT)
Facility: HOSPITAL | Age: 50
Setting detail: RECURRING SERIES
Discharge: HOME | End: 2025-01-13
Payer: MEDICARE

## 2025-01-10 ENCOUNTER — APPOINTMENT (OUTPATIENT)
Facility: HOSPITAL | Age: 50
End: 2025-01-10
Payer: MEDICARE

## 2025-01-10 PROCEDURE — 97112 NEUROMUSCULAR REEDUCATION: CPT

## 2025-01-10 PROCEDURE — 97530 THERAPEUTIC ACTIVITIES: CPT

## 2025-01-10 PROCEDURE — 97110 THERAPEUTIC EXERCISES: CPT

## 2025-01-10 NOTE — PROGRESS NOTES
PHYSICAL THERAPY - DAILY TREATMENT NOTE    Patient Name: Marquez Walker    Date: 1/10/2025    : 1975  Insurance: Payor: HU[employer] MEDICARE / Plan: HUMANA CHOICE-PPO MEDICARE / Product Type: *No Product type* /      Patient  verified YES   Visit #   Current / Total 8 16   Time   In / Out 9:45 10:38   Pain   In / Out 0 0   Subjective Functional Status/Changes:   Says will be flying down to [facility] on 25 and staying w his parents for 3 weeks.       TREATMENT AREA =  Unsteadiness on feet [R26.81]  Ataxic gait [R26.0]    OBJECTIVE    Therapeutic Procedures:  Tx Min Billable or 1:1 Min (if diff from Tx Min) Procedure, Rationale, Specifics   10  64201 Neuromuscular Re-Education (timed):  improve balance, coordination, kinesthetic sense, posture, core stability and proprioception to improve patient's ability to develop conscious control of individual muscles and awareness of position of extremities in order to progress to PLOF and address remaining functional goals. (see flow sheet as applicable)     Details if applicable:       33  19906 Therapeutic Activity (timed):  use of dynamic activities replicating functional movements to increase ROM, strength, coordination, balance, and proprioception in order to improve patient's ability to progress to PLOF and address remaining functional goals.  (see flow sheet as applicable)     Details if applicable:     10  93499 Therapeutic Exercise (timed):  increase ROM, strength, coordination, balance, and proprioception to improve patient's ability to progress to PLOF and address remaining functional goals. (see flow sheet as applicable)     Details if applicable:            Details if applicable:            Details if applicable:     53  [facility] Totals Reminder: bill using total billable min of TIMED therapeutic procedures (example: do not include dry needle or estim unattended, both untimed codes, in totals to left)  8-22 min = 1 unit; 23-37 min = 2 units; 38-52 min =

## 2025-01-13 ENCOUNTER — HOSPITAL ENCOUNTER (OUTPATIENT)
Facility: HOSPITAL | Age: 50
Setting detail: RECURRING SERIES
Discharge: HOME | End: 2025-01-16
Payer: MEDICARE

## 2025-01-13 PROCEDURE — 97112 NEUROMUSCULAR REEDUCATION: CPT

## 2025-01-13 PROCEDURE — 97110 THERAPEUTIC EXERCISES: CPT

## 2025-01-13 PROCEDURE — 97530 THERAPEUTIC ACTIVITIES: CPT

## 2025-01-13 NOTE — THERAPY RECERTIFICATION
[facility]  [address] [address] - Phone: [phone]  Fax: [phone]  CONTINUED PLAN OF CARE/RECERTIFICATION FOR PHYSICAL THERAPY          Patient Name: Marquez Walker : 1975   Treatment/Medical Diagnosis: Unsteadiness on feet [R26.81]  Ataxic gait [R26.0]   Onset Date: Chronic decline, reports worse \"lately\"     Referral Source: Perlman, Owen, MD Start of Care (SOC): 24   Prior Hospitalization: See Medical History Provider #: [id number]   Prior Level of Function: Chronic mobility deficits    Comorbidities: :  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 Arthritis, heart disease, HTN.      Visits from SOC: 9 Missed Visits: 0     Progress to Goals:  Short Term Goals: To be accomplished in 4 weeks  Pt to report adherence and demonstrate compliance with basic HEP to allow progress between visits Current: Doing his gym routine (25)  2.  Pt to improve FGA to 15/30 to allow improved function and QOL ( at [facility]) Current progressin/30 (25)  3. Pt to deny falls over the past 3 weeks to indicate improved safety and reduced falls risk. no falls since SOC (25)     Long Term Goals: To be accomplished in 8 weeks  Pt to improve MCGUIRE score to 40/56 indicating improved function in daily tasks and safety in home. (32/56 at [facility]) Current:  no change (25)  2.  Pt to indicate a Global Rating Of Change (GROC) of 4+ indicating \"moderately better\" NT (25)  3. Pt to deny falls over the past 7 weeks to indicate improved safety and reduced falls risk no falls since SOC (25)    Key Functional Changes/Progress: Pt seen for 9 visits to address gait and balance instability. Pt's chronic status remains, tone, ataxia and safety issues remain. We have been focusing on functional strength, stepping, sled push, squats, dynamic static balance

## 2025-01-13 NOTE — PROGRESS NOTES
safety and reduced falls risk no falls since SOC (1/13/25)      PLAN  YES Continue plan of care  [x]  Upgrade activities as tolerated  []  Discharge due to :  []  Other:    Ignacio Pham PT DPT, Cert DN   1/13/2025    6:49 AM    *If an interpreting service was utilized for treatment of this patient, the contents of this document represent the material reviewed with the patient via the .     Future Appointments   Date Time Provider Department Center   1/13/2025  9:00 AM [facility]   1/17/2025 11:00 AM [facility] Pham, [facility]

## 2025-01-15 ENCOUNTER — TELEPHONE (OUTPATIENT)
Facility: HOSPITAL | Age: 50
End: 2025-01-15

## 2025-01-17 ENCOUNTER — APPOINTMENT (OUTPATIENT)
Facility: HOSPITAL | Age: 50
End: 2025-01-17
Payer: MEDICARE

## 2025-01-20 ENCOUNTER — HOSPITAL ENCOUNTER (OUTPATIENT)
Facility: HOSPITAL | Age: 50
Setting detail: RECURRING SERIES
Discharge: HOME | End: 2025-01-23
Payer: MEDICARE

## 2025-01-20 PROCEDURE — 97530 THERAPEUTIC ACTIVITIES: CPT

## 2025-01-20 PROCEDURE — 97110 THERAPEUTIC EXERCISES: CPT

## 2025-01-20 PROCEDURE — 97112 NEUROMUSCULAR REEDUCATION: CPT

## 2025-01-20 NOTE — PROGRESS NOTES
PHYSICAL THERAPY - DAILY TREATMENT NOTE    Patient Name: Marquez Walker    Date: 2025    : 1975  Insurance: Payor: [employer]ICARE / Plan: [employer] CHOICE-PPO MEDICARE / Product Type: *No Product type* /      Patient  verified YES   Visit #   Current / Total 10 16   Time   In / Out 8:20 9:00   Pain   In / Out 0 0   Subjective Functional Status/Changes:   Was sore after last time. In the muscles. No falls. Got sick last week.        TREATMENT AREA =  Unsteadiness on feet [R26.81]  Ataxic gait [R26.0]    OBJECTIVE    Therapeutic Procedures:  Tx Min Billable or 1:1 Min (if diff from Tx Min) Procedure, Rationale, Specifics   10  36089 Neuromuscular Re-Education (timed):  improve balance, coordination, kinesthetic sense, posture, core stability and proprioception to improve patient's ability to develop conscious control of individual muscles and awareness of position of extremities in order to progress to PLOF and address remaining functional goals. (see flow sheet as applicable)     Details if applicable:       530 Therapeutic Activity (timed):  use of dynamic activities replicating functional movements to increase ROM, strength, coordination, balance, and proprioception in order to improve patient's ability to progress to PLOF and address remaining functional goals.  (see flow sheet as applicable)     Details if applicable:     10  93706 Therapeutic Exercise (timed):  increase ROM, strength, coordination, balance, and proprioception to improve patient's ability to progress to PLOF and address remaining functional goals. (see flow sheet as applicable)     Details if applicable:            Details if applicable:            Details if applicable:     40  [facility] BC Totals Reminder: bill using total billable min of TIMED therapeutic procedures (example: do not include dry needle or estim unattended, both untimed codes, in totals to left)  8-22 min = 1 unit; 23-37 min = 2 units; 38-52 min = 3 units; 53-67  [facility] Triage Nurse Telephone Encounter    Provider: NOHEMI Tee CNP  Facility: [facility]  Facility Type:  TCU    Caller: Casandra   Call Back Number: [phone]    Allergies:    Allergies   Allergen Reactions     Diagnostic X-Ray Materials Hives     CT DYE     Contrast Dye Hives     CT DYE     Prolia [Denosumab]      Osteonecrosis jaw       Rocephin [Ceftriaxone] Itching     Wound Dressing Adhesive         Reason for call: Pt was taking imodium 2mg bid @ home and at the care conference the pt and family requested this d/t loose stools.     Verbal Order/Direction given by Provider: Ok for Imodium 2mg bid prn.     Provider giving Order:  Manuela Hannon MD    Verbal Order given to: Casandra Galindo RN

## 2025-01-22 ENCOUNTER — TELEPHONE (OUTPATIENT)
Facility: HOSPITAL | Age: 50
End: 2025-01-22

## 2025-01-22 ENCOUNTER — APPOINTMENT (OUTPATIENT)
Facility: HOSPITAL | Age: 50
End: 2025-01-22
Payer: MEDICARE

## 2025-01-22 NOTE — TELEPHONE ENCOUNTER
Weather Cancellation. Attempted to r/s today's appt due to a delayed start, patient is unable to reschedule at this time. Appt cancelled due to weather. Patient will call back to reschedule.
